# Patient Record
Sex: FEMALE | Race: WHITE | NOT HISPANIC OR LATINO | Employment: FULL TIME | URBAN - METROPOLITAN AREA
[De-identification: names, ages, dates, MRNs, and addresses within clinical notes are randomized per-mention and may not be internally consistent; named-entity substitution may affect disease eponyms.]

---

## 2018-10-03 ENCOUNTER — OFFICE VISIT (OUTPATIENT)
Dept: URGENT CARE | Facility: CLINIC | Age: 28
End: 2018-10-03
Payer: COMMERCIAL

## 2018-10-03 VITALS
SYSTOLIC BLOOD PRESSURE: 118 MMHG | HEART RATE: 88 BPM | BODY MASS INDEX: 31.08 KG/M2 | WEIGHT: 198 LBS | HEIGHT: 67 IN | OXYGEN SATURATION: 97 % | TEMPERATURE: 97.5 F | DIASTOLIC BLOOD PRESSURE: 80 MMHG | RESPIRATION RATE: 16 BRPM

## 2018-10-03 DIAGNOSIS — L08.9 BITE OF RIGHT LOWER LEG WITH INFECTION, INITIAL ENCOUNTER: ICD-10-CM

## 2018-10-03 DIAGNOSIS — S81.851A BITE OF RIGHT LOWER LEG WITH INFECTION, INITIAL ENCOUNTER: ICD-10-CM

## 2018-10-03 PROCEDURE — 99202 OFFICE O/P NEW SF 15 MIN: CPT | Performed by: PHYSICIAN ASSISTANT

## 2018-10-03 RX ORDER — MOXIFLOXACIN HYDROCHLORIDE 400 MG/1
400 TABLET ORAL DAILY
Qty: 3 TAB | Refills: 1 | Status: SHIPPED | OUTPATIENT
Start: 2018-10-03 | End: 2018-10-06

## 2018-10-03 RX ORDER — CLINDAMYCIN HYDROCHLORIDE 300 MG/1
300 CAPSULE ORAL 3 TIMES DAILY
Qty: 21 CAP | Refills: 0 | Status: SHIPPED | OUTPATIENT
Start: 2018-10-03 | End: 2018-10-10

## 2018-10-03 RX ORDER — DEXAMETHASONE SODIUM PHOSPHATE 4 MG/ML
8 INJECTION, SOLUTION INTRA-ARTICULAR; INTRALESIONAL; INTRAMUSCULAR; INTRAVENOUS; SOFT TISSUE ONCE
Status: COMPLETED | OUTPATIENT
Start: 2018-10-03 | End: 2018-10-03

## 2018-10-03 RX ORDER — CEFTRIAXONE SODIUM 250 MG/1
500 INJECTION, POWDER, FOR SOLUTION INTRAMUSCULAR; INTRAVENOUS ONCE
Status: COMPLETED | OUTPATIENT
Start: 2018-10-03 | End: 2018-10-03

## 2018-10-03 RX ORDER — DEXAMETHASONE 4 MG/1
8 TABLET ORAL DAILY
Qty: 4 TAB | Refills: 0 | Status: SHIPPED | OUTPATIENT
Start: 2018-10-03 | End: 2018-10-05

## 2018-10-03 RX ADMIN — CEFTRIAXONE SODIUM 500 MG: 250 INJECTION, POWDER, FOR SOLUTION INTRAMUSCULAR; INTRAVENOUS at 16:17

## 2018-10-03 RX ADMIN — DEXAMETHASONE SODIUM PHOSPHATE 8 MG: 4 INJECTION, SOLUTION INTRA-ARTICULAR; INTRALESIONAL; INTRAMUSCULAR; INTRAVENOUS; SOFT TISSUE at 16:20

## 2018-10-03 ASSESSMENT — ENCOUNTER SYMPTOMS
NEUROLOGICAL NEGATIVE: 1
FEVER: 0
MUSCULOSKELETAL NEGATIVE: 1
JOINT SWELLING: 0
CONSTITUTIONAL NEGATIVE: 1
SWOLLEN GLANDS: 0

## 2018-10-03 NOTE — PROGRESS NOTES
"Subjective:      Sobeida Valle is a 28 y.o. female who presents with Spider Bite (x3days, right lower leg spider bite, swollen, redness, pain, itchy)            Other   This is a new (r lower leg bite/infection) problem. The current episode started in the past 7 days. The problem occurs constantly. The problem has been unchanged. Pertinent negatives include no fever, joint swelling or swollen glands. Nothing aggravates the symptoms. She has tried nothing for the symptoms. The treatment provided no relief.       Review of Systems   Constitutional: Negative.  Negative for fever.   Musculoskeletal: Negative.  Negative for joint swelling.   Skin: Negative.    Neurological: Negative.           Objective:     /80 (BP Location: Left arm, Patient Position: Sitting, BP Cuff Size: Adult)   Pulse 88   Temp 36.4 °C (97.5 °F) (Temporal)   Resp 16   Ht 1.689 m (5' 6.5\")   Wt 89.8 kg (198 lb)   SpO2 97%   BMI 31.48 kg/m²      Physical Exam   Constitutional: She is oriented to person, place, and time. She appears well-developed and well-nourished. No distress.   Musculoskeletal: Normal range of motion. She exhibits tenderness. She exhibits no edema.   Neurological: She is alert and oriented to person, place, and time. No sensory deficit. She exhibits normal muscle tone. Coordination normal.   Skin: Skin is warm and dry. Capillary refill takes less than 2 seconds. There is erythema.   Psychiatric: She has a normal mood and affect. Her behavior is normal.   Nursing note and vitals reviewed.    Active Ambulatory Problems     Diagnosis Date Noted   • No Active Ambulatory Problems     Resolved Ambulatory Problems     Diagnosis Date Noted   • No Resolved Ambulatory Problems     No Additional Past Medical History     No current outpatient prescriptions on file prior to visit.     No current facility-administered medications on file prior to visit.      Social History     Social History   • Marital status:      Spouse " name: N/A   • Number of children: N/A   • Years of education: N/A     Occupational History   • Not on file.     Social History Main Topics   • Smoking status: Never Smoker   • Smokeless tobacco: Never Used   • Alcohol use Not on file   • Drug use: Unknown   • Sexual activity: Not on file     Other Topics Concern   • Not on file     Social History Narrative   • No narrative on file     History reviewed. No pertinent family history.  Patient has no known allergies.              Assessment/Plan:     ·  skin infection/inflamm      rx meds; local wound care  Go to ER if worsens

## 2018-12-31 ENCOUNTER — OFFICE VISIT (OUTPATIENT)
Dept: URGENT CARE | Facility: CLINIC | Age: 28
End: 2018-12-31
Payer: COMMERCIAL

## 2018-12-31 VITALS
BODY MASS INDEX: 32.95 KG/M2 | OXYGEN SATURATION: 98 % | HEART RATE: 83 BPM | WEIGHT: 205 LBS | TEMPERATURE: 97.9 F | DIASTOLIC BLOOD PRESSURE: 88 MMHG | SYSTOLIC BLOOD PRESSURE: 112 MMHG | RESPIRATION RATE: 16 BRPM | HEIGHT: 66 IN

## 2018-12-31 DIAGNOSIS — J02.9 SORE THROAT: ICD-10-CM

## 2018-12-31 DIAGNOSIS — J01.40 ACUTE PANSINUSITIS, RECURRENCE NOT SPECIFIED: ICD-10-CM

## 2018-12-31 DIAGNOSIS — J45.901 MILD ASTHMA WITH ACUTE EXACERBATION, UNSPECIFIED WHETHER PERSISTENT: ICD-10-CM

## 2018-12-31 LAB
INT CON NEG: NEGATIVE
INT CON POS: POSITIVE
S PYO AG THROAT QL: NEGATIVE

## 2018-12-31 PROCEDURE — 87880 STREP A ASSAY W/OPTIC: CPT | Performed by: PHYSICIAN ASSISTANT

## 2018-12-31 PROCEDURE — 99214 OFFICE O/P EST MOD 30 MIN: CPT | Performed by: PHYSICIAN ASSISTANT

## 2018-12-31 RX ORDER — AMOXICILLIN AND CLAVULANATE POTASSIUM 875; 125 MG/1; MG/1
1 TABLET, FILM COATED ORAL 2 TIMES DAILY
Qty: 14 TAB | Refills: 0 | Status: SHIPPED | OUTPATIENT
Start: 2018-12-31 | End: 2019-01-07

## 2018-12-31 RX ORDER — ALBUTEROL SULFATE 90 UG/1
2 AEROSOL, METERED RESPIRATORY (INHALATION) EVERY 6 HOURS PRN
Qty: 8.5 G | Refills: 0 | Status: SHIPPED | OUTPATIENT
Start: 2018-12-31

## 2018-12-31 RX ORDER — ACETAMINOPHEN 325 MG/1
650 TABLET ORAL EVERY 4 HOURS PRN
COMMUNITY

## 2018-12-31 ASSESSMENT — ENCOUNTER SYMPTOMS
EYE REDNESS: 0
SWOLLEN GLANDS: 1
HOARSE VOICE: 1
WHEEZING: 1
SINUS PRESSURE: 1
COUGH: 1
TINGLING: 0
FEVER: 0
MYALGIAS: 0
CHILLS: 0
HEADACHES: 1
DIZZINESS: 0
EYE DISCHARGE: 0
NECK PAIN: 0
SINUS PAIN: 1
SORE THROAT: 1
VOMITING: 0
DIARRHEA: 0

## 2018-12-31 ASSESSMENT — PATIENT HEALTH QUESTIONNAIRE - PHQ9: CLINICAL INTERPRETATION OF PHQ2 SCORE: 0

## 2018-12-31 NOTE — PROGRESS NOTES
"Subjective:      Sobeida Valle is a 28 y.o. female who presents with Pharyngitis (cough and sinus x3wks, taking tylenol not helping, post nasal drip, congestion on both ears, taking otc without help)            Sinusitis   This is a new problem. Episode onset: 3 weeks ago. The problem has been gradually worsening since onset. There has been no fever. Her pain is at a severity of 4/10. The pain is moderate. Associated symptoms include congestion, coughing, headaches, a hoarse voice, sinus pressure, a sore throat and swollen glands. Pertinent negatives include no chills, ear pain or neck pain. Treatments tried: gargles, cough drops, theraflu.    Patient also reports history of asthma of which she currently does not have a rescue inhaler at this time.  She does report slight wheezing intermittently over the last few weeks.  She currently denies any shortness of breath or chest tightness.    Review of Systems   Constitutional: Positive for malaise/fatigue. Negative for chills and fever.   HENT: Positive for congestion, hoarse voice, sinus pain, sinus pressure and sore throat. Negative for ear discharge and ear pain.         Pos. For ear pressure     Eyes: Negative for discharge and redness.   Respiratory: Positive for cough and wheezing.    Cardiovascular: Negative for chest pain.   Gastrointestinal: Negative for diarrhea and vomiting.   Genitourinary: Negative for dysuria and urgency.   Musculoskeletal: Negative for myalgias and neck pain.   Skin: Negative for itching and rash.   Neurological: Positive for headaches. Negative for dizziness and tingling.   All other systems reviewed and are negative.         Objective:     /88 (BP Location: Left arm, Patient Position: Sitting, BP Cuff Size: Adult)   Pulse 83   Temp 36.6 °C (97.9 °F) (Temporal)   Resp 16   Ht 1.676 m (5' 6\")   Wt 93 kg (205 lb)   LMP 12/05/2018   SpO2 98%   Breastfeeding? No   BMI 33.09 kg/m²    PMH:  has no past medical history on " file.  MEDS:   Current Outpatient Prescriptions:   •  acetaminophen (TYLENOL) 325 MG Tab, Take 650 mg by mouth every four hours as needed., Disp: , Rfl:   •  amoxicillin-clavulanate (AUGMENTIN) 875-125 MG Tab, Take 1 Tab by mouth 2 times a day for 7 days., Disp: 14 Tab, Rfl: 0  •  albuterol 108 (90 Base) MCG/ACT Aero Soln inhalation aerosol, Inhale 2 Puffs by mouth every 6 hours as needed for Shortness of Breath., Disp: 8.5 g, Rfl: 0  •  IBUPROFEN PO, Take  by mouth., Disp: , Rfl:   •  DiphenhydrAMINE HCl (BENADRYL PO), Take  by mouth., Disp: , Rfl:   ALLERGIES:   Allergies   Allergen Reactions   • Sudafed      bp goes up     SURGHX: No past surgical history on file.  SOCHX:  reports that she has never smoked. She has never used smokeless tobacco.  FH: Family history was reviewed, no pertinent findings to report    Physical Exam   Constitutional: She is oriented to person, place, and time. She appears well-developed and well-nourished.   HENT:   Head: Normocephalic and atraumatic.   Mouth/Throat: No oropharyngeal exudate.   Bilateral clear effusions- without bulge or erythema to the TM.   Posterior oropharynx with pos. PND without tonsillar edema or erythema.   Nose- boggy turbinates with mild-moderate amount of nasal discharge. Bilateral maxillary sinus tenderness with percussion.    Eyes: Pupils are equal, round, and reactive to light. EOM are normal.   Neck: Normal range of motion. Neck supple.   Cardiovascular: Normal rate and regular rhythm.    Pulmonary/Chest: Effort normal and breath sounds normal. No respiratory distress. She has no wheezes.   Musculoskeletal: Normal range of motion. She exhibits no edema.   Lymphadenopathy:     She has no cervical adenopathy.   Neurological: She is alert and oriented to person, place, and time.   Skin: Skin is warm. No rash noted.   Psychiatric: She has a normal mood and affect. Her behavior is normal.   Vitals reviewed.            POC strep- negative.     Assessment/Plan:      1. Acute pansinusitis, recurrence not specified  - amoxicillin-clavulanate (AUGMENTIN) 875-125 MG Tab; Take 1 Tab by mouth 2 times a day for 7 days.  Dispense: 14 Tab; Refill: 0  - albuterol 108 (90 Base) MCG/ACT Aero Soln inhalation aerosol; Inhale 2 Puffs by mouth every 6 hours as needed for Shortness of Breath.  Dispense: 8.5 g; Refill: 0  - POCT Rapid Strep A    2. Sore throat  - POCT Rapid Strep A    3. Mild asthma with acute exacerbation, unspecified whether persistent    Due to duration of symptoms, sinus tenderness, and failure of OTC therapies- ABX was written to tx. For bacterial etiology for sinusitis.   Continue OTC supportive therapies- Flonase, OTC allergy meds, avoid night time dairy. Increase fluids. Humidification.   Patient given precautionary s/sx that mandate immediate follow up and evaluation in the ED. Advised of risks of not doing so.    DDX, Supportive care, and indications for immediate follow-up discussed with patient.    Instructed to return to clinic or nearest emergency department if we are not available for any change in condition, further concerns, or worsening of symptoms.    The patient demonstrated a good understanding and agreed with the treatment plan

## 2019-12-01 ENCOUNTER — APPOINTMENT (OUTPATIENT)
Dept: RADIOLOGY | Facility: IMAGING CENTER | Age: 29
End: 2019-12-01
Attending: EMERGENCY MEDICINE
Payer: COMMERCIAL

## 2019-12-01 ENCOUNTER — OFFICE VISIT (OUTPATIENT)
Dept: URGENT CARE | Facility: CLINIC | Age: 29
End: 2019-12-01
Payer: COMMERCIAL

## 2019-12-01 VITALS
OXYGEN SATURATION: 96 % | RESPIRATION RATE: 18 BRPM | DIASTOLIC BLOOD PRESSURE: 80 MMHG | WEIGHT: 205 LBS | TEMPERATURE: 98.3 F | BODY MASS INDEX: 32.95 KG/M2 | SYSTOLIC BLOOD PRESSURE: 116 MMHG | HEIGHT: 66 IN | HEART RATE: 95 BPM

## 2019-12-01 DIAGNOSIS — M23.91 KNEE DERANGEMENT, RIGHT: ICD-10-CM

## 2019-12-01 DIAGNOSIS — S89.91XA KNEE INJURY, RIGHT, INITIAL ENCOUNTER: ICD-10-CM

## 2019-12-01 PROCEDURE — 73564 X-RAY EXAM KNEE 4 OR MORE: CPT | Mod: TC,RT | Performed by: EMERGENCY MEDICINE

## 2019-12-01 PROCEDURE — 99203 OFFICE O/P NEW LOW 30 MIN: CPT | Performed by: EMERGENCY MEDICINE

## 2019-12-01 ASSESSMENT — PAIN SCALES - GENERAL: PAINLEVEL: 4=SLIGHT-MODERATE PAIN

## 2019-12-01 ASSESSMENT — ENCOUNTER SYMPTOMS
SENSORY CHANGE: 0
FEVER: 0
NUMBNESS: 0
JOINT SWELLING: 0
WEAKNESS: 0

## 2019-12-01 NOTE — PROGRESS NOTES
"Subjective:      Sobeida Valle is a 29 y.o. female who presents with Knee Injury (right knee, fell off trampoline )            Knee Injury   This is a new problem. The current episode started today. Pertinent negatives include no fever, joint swelling, numbness, rash or weakness. The symptoms are aggravated by bending and walking. She has tried rest and ice for the symptoms. The treatment provided mild relief.   Notes twisted knee when falling off of a trampoline today; no other trauma.  Notes remote history of hyperextension injury of right knee.  Notes popping sensation, sense of instability associated with injury.    Review of Systems   Constitutional: Negative for fever.   Musculoskeletal: Negative for joint swelling.   Skin: Negative for rash.   Neurological: Negative for sensory change, weakness and numbness.       PMH:  has no past medical history on file.  MEDS:   Current Outpatient Medications:   •  acetaminophen (TYLENOL) 325 MG Tab, Take 650 mg by mouth every four hours as needed., Disp: , Rfl:   •  albuterol 108 (90 Base) MCG/ACT Aero Soln inhalation aerosol, Inhale 2 Puffs by mouth every 6 hours as needed for Shortness of Breath., Disp: 8.5 g, Rfl: 0  •  IBUPROFEN PO, Take  by mouth., Disp: , Rfl:   •  DiphenhydrAMINE HCl (BENADRYL PO), Take  by mouth., Disp: , Rfl:   ALLERGIES:   Allergies   Allergen Reactions   • Sudafed      bp goes up     SURGHX: No past surgical history on file.  SOCHX:  reports that she has never smoked. She has never used smokeless tobacco.  FH: family history is not on file.   Objective:     /80   Pulse 95   Temp 36.8 °C (98.3 °F) (Temporal)   Resp 18   Ht 1.676 m (5' 6\")   Wt 93 kg (205 lb)   SpO2 96%   BMI 33.09 kg/m²      Physical Exam  Constitutional:       General: She is not in acute distress.     Appearance: Normal appearance. She is well-developed. She is not ill-appearing.   Cardiovascular:      Pulses:           Dorsalis pedis pulses are 2+ on the right " side.   Musculoskeletal:      Right knee: She exhibits decreased range of motion, effusion, ecchymosis and LCL laxity. She exhibits no deformity, no erythema, normal alignment, normal patellar mobility and no MCL laxity. Tenderness found. Lateral joint line and LCL tenderness noted. No patellar tendon tenderness noted.      Comments: Soft endpoint on drawer testing; no gross instability.   Skin:     General: Skin is warm and dry.      Findings: Ecchymosis present.   Neurological:      Mental Status: She is alert.      Comments: Distal motor function intact. Distal sensation to light touch and pressure intact.   Psychiatric:         Behavior: Behavior is cooperative.            Advised patient of suspicion for cruciate ligament injury.     Assessment/Plan:       1. Knee derangement, right  Hinged knee brace, crutches and nonweightbearing  Elevation, ice, OTC analgesia PRN.  Referral to sports medicine    2. Knee injury, right, initial encounter  - DX-KNEE COMPLETE 4+ RIGHT; per radiologist:  Bone density is normal. There is no evidence of fracture or dislocation. There is no evidence of arthropathy. There is a small joint effusion.

## 2019-12-04 ENCOUNTER — OFFICE VISIT (OUTPATIENT)
Dept: MEDICAL GROUP | Facility: CLINIC | Age: 29
End: 2019-12-04
Payer: COMMERCIAL

## 2019-12-04 VITALS
DIASTOLIC BLOOD PRESSURE: 80 MMHG | SYSTOLIC BLOOD PRESSURE: 120 MMHG | HEART RATE: 86 BPM | HEIGHT: 66 IN | TEMPERATURE: 98 F | OXYGEN SATURATION: 98 % | WEIGHT: 205 LBS | RESPIRATION RATE: 14 BRPM | BODY MASS INDEX: 32.95 KG/M2

## 2019-12-04 DIAGNOSIS — M23.91 ACUTE INTERNAL DERANGEMENT OF RIGHT KNEE: ICD-10-CM

## 2019-12-04 PROCEDURE — 99203 OFFICE O/P NEW LOW 30 MIN: CPT | Performed by: FAMILY MEDICINE

## 2019-12-05 ASSESSMENT — ENCOUNTER SYMPTOMS
SHORTNESS OF BREATH: 0
FEVER: 0
FOCAL WEAKNESS: 0
VOMITING: 0

## 2019-12-05 NOTE — PROGRESS NOTES
"Subjective:     Sobeida Valle is a 29 y.o. female who presents for Knee Injury (Onset 12/1/19, fell off trampoline and injurying right knee. The patient has a H/O of injurying the right knee in the past.)    HPI  Pt presents for evaluation of right knee pain   Pt jumping on trampoline   Landed off the trampoline and landed on footed on right leg   Had immediate pain and felt a pop   Knee feels very unstable   Unable to bear weight without significant pain  Has been on crutches  Was seen in urgent care and had x-ray which did not show acute fracture or dislocation  Pain is mainly in the anterior knee  No radiating pain into foot/ankle  No associated numbness/tingling    Review of Systems   Constitutional: Negative for fever.   Respiratory: Negative for shortness of breath.    Cardiovascular: Negative for chest pain.   Gastrointestinal: Negative for vomiting.   Skin: Negative for rash.   Neurological: Negative for focal weakness.       PMH: No chronic medical problems   MEDS:   Current Outpatient Medications:   •  acetaminophen (TYLENOL) 325 MG Tab, Take 650 mg by mouth every four hours as needed., Disp: , Rfl:   •  IBUPROFEN PO, Take 1 Tab by mouth as needed., Disp: , Rfl:   •  albuterol 108 (90 Base) MCG/ACT Aero Soln inhalation aerosol, Inhale 2 Puffs by mouth every 6 hours as needed for Shortness of Breath. (Patient not taking: Reported on 12/4/2019), Disp: 8.5 g, Rfl: 0  •  DiphenhydrAMINE HCl (BENADRYL PO), Take  by mouth., Disp: , Rfl:   ALLERGIES:   Allergies   Allergen Reactions   • Sudafed      bp goes up     SURGHX: No past surgical history on file.  SOCHX:  reports that she has never smoked. She has never used smokeless tobacco.  FH: Family history was reviewed, not contributing to acute injury      Objective:   /80 (BP Location: Left arm, Patient Position: Sitting, BP Cuff Size: Adult)   Pulse 86   Temp 36.7 °C (98 °F) (Temporal)   Resp 14   Ht 1.676 m (5' 6\")   Wt 93 kg (205 lb)   SpO2 98%   " BMI 33.09 kg/m²     Physical Exam  Constitutional:       General: She is not in acute distress.     Appearance: She is well-developed. She is not diaphoretic.   HENT:      Head: Normocephalic and atraumatic.   Musculoskeletal:      Comments: Right knee  Appearance -moderate swelling and effusion appreciated  Palpation - +TTP along lateral joint line, patellar tendon, medial quad, and pes anserine  ROM - FROM with moderate pain on flexion past 90  Strength - 5/5 throughout  Neuro - Sensation equal and intact bilaterally  Special testing - No laxity or pain with varus/valgus stress, +anterior drawer, neg posterior drawer, +Lachman's, neg Humberto's, neg patellar apprehension test   Skin:     General: Skin is warm and dry.      Findings: No erythema.   Neurological:      Mental Status: She is alert and oriented to person, place, and time.      Sensory: No sensory deficit.   Psychiatric:         Behavior: Behavior normal.         Thought Content: Thought content normal.         Judgment: Judgment normal.         Assessment/Plan:   Assessment    1. Acute internal derangement of right knee  - MR-KNEE-W/O RIGHT; Future    Pt with laxity on exam and high concern for ACL tear.  Will obtain MRI to further evaluate and call with results.  Patient advised to work on light range of motion exercises.  May start ambulating with crutches to assist if feeling improved.

## 2019-12-12 ENCOUNTER — HOSPITAL ENCOUNTER (OUTPATIENT)
Dept: RADIOLOGY | Facility: MEDICAL CENTER | Age: 29
End: 2019-12-12
Attending: FAMILY MEDICINE
Payer: COMMERCIAL

## 2019-12-12 DIAGNOSIS — M23.91 ACUTE INTERNAL DERANGEMENT OF RIGHT KNEE: ICD-10-CM

## 2019-12-12 PROCEDURE — 73721 MRI JNT OF LWR EXTRE W/O DYE: CPT | Mod: RT

## 2019-12-13 ENCOUNTER — TELEPHONE (OUTPATIENT)
Dept: URGENT CARE | Facility: CLINIC | Age: 29
End: 2019-12-13

## 2019-12-13 DIAGNOSIS — S83.511A RUPTURE OF ANTERIOR CRUCIATE LIGAMENT OF RIGHT KNEE, INITIAL ENCOUNTER: ICD-10-CM

## 2019-12-13 NOTE — TELEPHONE ENCOUNTER
Called pt with MRI results.  Shows the followin.  Full-thickness tear of the midsubstance of the anterior cruciate ligament.     2.  Mild sprain of the posterior cruciate ligament.     3.  Mild sprain of the medial collateral and fibular collateral ligaments.     4.  Marrow edema with mild subchondral impaction fracture of the proximal tibia posterior laterally and to a lesser degree posterior medially. There is also mild marrow edema involving the lateral femoral condyle.     5.  Large joint effusion.     6.  Mild fissuring of the lateral patellar facet cartilage.    Patient referred to orthopedics.  She specifically requests to see Dr. Palencia as he is done surgery on a friend of hers.  Patient advised to be nonweightbearing for now until she can get into orthopedics.

## 2019-12-17 RX ORDER — IBUPROFEN 200 MG
200 TABLET ORAL EVERY 6 HOURS PRN
COMMUNITY
End: 2022-06-24

## 2019-12-17 NOTE — OR NURSING
"Preadmit appointment: \" Preparing for your Procedure information\" sheet given to patient with verbal and written instructions. Patient instructed to continue prescribed medications through the day before surgery, instructed to take the following medications the day of surgery per anesthesia protocol: Albuterol inhaler if needed and pt instructed to bring day of surgery.  Pt denies any issues with anesthesia for herself or her family,  Pt has only had wisdom teeth.   "

## 2019-12-19 ENCOUNTER — HOSPITAL ENCOUNTER (OUTPATIENT)
Facility: MEDICAL CENTER | Age: 29
End: 2019-12-19
Attending: ORTHOPAEDIC SURGERY | Admitting: ORTHOPAEDIC SURGERY
Payer: COMMERCIAL

## 2019-12-19 ENCOUNTER — ANESTHESIA EVENT (OUTPATIENT)
Dept: SURGERY | Facility: MEDICAL CENTER | Age: 29
End: 2019-12-19
Payer: COMMERCIAL

## 2019-12-19 ENCOUNTER — ANESTHESIA (OUTPATIENT)
Dept: SURGERY | Facility: MEDICAL CENTER | Age: 29
End: 2019-12-19
Payer: COMMERCIAL

## 2019-12-19 VITALS
RESPIRATION RATE: 16 BRPM | DIASTOLIC BLOOD PRESSURE: 65 MMHG | OXYGEN SATURATION: 96 % | TEMPERATURE: 97.2 F | SYSTOLIC BLOOD PRESSURE: 133 MMHG | HEIGHT: 66 IN | BODY MASS INDEX: 32.99 KG/M2 | WEIGHT: 205.25 LBS | HEART RATE: 94 BPM

## 2019-12-19 DIAGNOSIS — Z98.890 S/P ARTHROSCOPY OF RIGHT KNEE: ICD-10-CM

## 2019-12-19 LAB — HCG UR QL: NEGATIVE

## 2019-12-19 PROCEDURE — 700101 HCHG RX REV CODE 250: Performed by: ORTHOPAEDIC SURGERY

## 2019-12-19 PROCEDURE — 700111 HCHG RX REV CODE 636 W/ 250 OVERRIDE (IP): Performed by: ORTHOPAEDIC SURGERY

## 2019-12-19 PROCEDURE — C1762 CONN TISS, HUMAN(INC FASCIA): HCPCS | Performed by: ORTHOPAEDIC SURGERY

## 2019-12-19 PROCEDURE — 160025 RECOVERY II MINUTES (STATS): Performed by: ORTHOPAEDIC SURGERY

## 2019-12-19 PROCEDURE — 501838 HCHG SUTURE GENERAL: Performed by: ORTHOPAEDIC SURGERY

## 2019-12-19 PROCEDURE — 700105 HCHG RX REV CODE 258: Performed by: ORTHOPAEDIC SURGERY

## 2019-12-19 PROCEDURE — 502580 HCHG PACK, KNEE ARTHROSCOPY: Performed by: ORTHOPAEDIC SURGERY

## 2019-12-19 PROCEDURE — A9270 NON-COVERED ITEM OR SERVICE: HCPCS | Performed by: ANESTHESIOLOGY

## 2019-12-19 PROCEDURE — 160046 HCHG PACU - 1ST 60 MINS PHASE II: Performed by: ORTHOPAEDIC SURGERY

## 2019-12-19 PROCEDURE — 700101 HCHG RX REV CODE 250: Performed by: ANESTHESIOLOGY

## 2019-12-19 PROCEDURE — 700102 HCHG RX REV CODE 250 W/ 637 OVERRIDE(OP): Performed by: ANESTHESIOLOGY

## 2019-12-19 PROCEDURE — 160036 HCHG PACU - EA ADDL 30 MINS PHASE I: Performed by: ORTHOPAEDIC SURGERY

## 2019-12-19 PROCEDURE — 160041 HCHG SURGERY MINUTES - EA ADDL 1 MIN LEVEL 4: Performed by: ORTHOPAEDIC SURGERY

## 2019-12-19 PROCEDURE — 500562 HCHG FIBERWIRE: Performed by: ORTHOPAEDIC SURGERY

## 2019-12-19 PROCEDURE — 700111 HCHG RX REV CODE 636 W/ 250 OVERRIDE (IP): Performed by: ANESTHESIOLOGY

## 2019-12-19 PROCEDURE — 501512 HCHG SUTURE PASSER, KNOT PUSHER: Performed by: ORTHOPAEDIC SURGERY

## 2019-12-19 PROCEDURE — 160022 HCHG BLOCK: Performed by: ORTHOPAEDIC SURGERY

## 2019-12-19 PROCEDURE — 81025 URINE PREGNANCY TEST: CPT

## 2019-12-19 PROCEDURE — 160002 HCHG RECOVERY MINUTES (STAT): Performed by: ORTHOPAEDIC SURGERY

## 2019-12-19 PROCEDURE — 160009 HCHG ANES TIME/MIN: Performed by: ORTHOPAEDIC SURGERY

## 2019-12-19 PROCEDURE — A9270 NON-COVERED ITEM OR SERVICE: HCPCS | Performed by: ORTHOPAEDIC SURGERY

## 2019-12-19 PROCEDURE — 502240 HCHG MISC OR SUPPLY RC 0272: Performed by: ORTHOPAEDIC SURGERY

## 2019-12-19 PROCEDURE — 700102 HCHG RX REV CODE 250 W/ 637 OVERRIDE(OP): Performed by: ORTHOPAEDIC SURGERY

## 2019-12-19 PROCEDURE — 160029 HCHG SURGERY MINUTES - 1ST 30 MINS LEVEL 4: Performed by: ORTHOPAEDIC SURGERY

## 2019-12-19 PROCEDURE — 160035 HCHG PACU - 1ST 60 MINS PHASE I: Performed by: ORTHOPAEDIC SURGERY

## 2019-12-19 PROCEDURE — C1713 ANCHOR/SCREW BN/BN,TIS/BN: HCPCS | Performed by: ORTHOPAEDIC SURGERY

## 2019-12-19 PROCEDURE — 160048 HCHG OR STATISTICAL LEVEL 1-5: Performed by: ORTHOPAEDIC SURGERY

## 2019-12-19 PROCEDURE — 500028 HCHG ARTHROWAND TURBOVAC 3.5/90 SUCT.: Performed by: ORTHOPAEDIC SURGERY

## 2019-12-19 DEVICE — ANCHOR SUTURE FASTFIX 360 CURVED: Type: IMPLANTABLE DEVICE | Site: KNEE | Status: FUNCTIONAL

## 2019-12-19 DEVICE — SCREW BIOSURE 10 X 25MM: Type: IMPLANTABLE DEVICE | Site: KNEE | Status: FUNCTIONAL

## 2019-12-19 DEVICE — SUTURE ANCHOR FOOTPRINT FIX 4.5MM: Type: IMPLANTABLE DEVICE | Site: KNEE | Status: FUNCTIONAL

## 2019-12-19 DEVICE — GRAFT SOFT TISSUE ANTERIOR TIBIALIS TENDON 23CM: Type: IMPLANTABLE DEVICE | Site: KNEE | Status: FUNCTIONAL

## 2019-12-19 DEVICE — ENDOBUTTON CL ULTRA 15MM: Type: IMPLANTABLE DEVICE | Site: KNEE | Status: FUNCTIONAL

## 2019-12-19 RX ORDER — BACITRACIN ZINC 500 [USP'U]/G
OINTMENT TOPICAL
Status: DISCONTINUED | OUTPATIENT
Start: 2019-12-19 | End: 2019-12-19 | Stop reason: HOSPADM

## 2019-12-19 RX ORDER — DIPHENHYDRAMINE HYDROCHLORIDE 50 MG/ML
6.25 INJECTION INTRAMUSCULAR; INTRAVENOUS
Status: DISCONTINUED | OUTPATIENT
Start: 2019-12-19 | End: 2019-12-19 | Stop reason: HOSPADM

## 2019-12-19 RX ORDER — HYDROMORPHONE HYDROCHLORIDE 1 MG/ML
0.1 INJECTION, SOLUTION INTRAMUSCULAR; INTRAVENOUS; SUBCUTANEOUS
Status: DISCONTINUED | OUTPATIENT
Start: 2019-12-19 | End: 2019-12-19 | Stop reason: HOSPADM

## 2019-12-19 RX ORDER — HYDROMORPHONE HYDROCHLORIDE 1 MG/ML
0.4 INJECTION, SOLUTION INTRAMUSCULAR; INTRAVENOUS; SUBCUTANEOUS
Status: DISCONTINUED | OUTPATIENT
Start: 2019-12-19 | End: 2019-12-19 | Stop reason: HOSPADM

## 2019-12-19 RX ORDER — LIDOCAINE HYDROCHLORIDE 10 MG/ML
INJECTION, SOLUTION INFILTRATION; PERINEURAL
Status: DISCONTINUED
Start: 2019-12-19 | End: 2019-12-19 | Stop reason: HOSPADM

## 2019-12-19 RX ORDER — LIDOCAINE HYDROCHLORIDE 20 MG/ML
INJECTION, SOLUTION EPIDURAL; INFILTRATION; INTRACAUDAL; PERINEURAL PRN
Status: DISCONTINUED | OUTPATIENT
Start: 2019-12-19 | End: 2019-12-19 | Stop reason: SURG

## 2019-12-19 RX ORDER — ONDANSETRON 2 MG/ML
4 INJECTION INTRAMUSCULAR; INTRAVENOUS
Status: COMPLETED | OUTPATIENT
Start: 2019-12-19 | End: 2019-12-19

## 2019-12-19 RX ORDER — HYDRALAZINE HYDROCHLORIDE 20 MG/ML
5 INJECTION INTRAMUSCULAR; INTRAVENOUS
Status: DISCONTINUED | OUTPATIENT
Start: 2019-12-19 | End: 2019-12-19 | Stop reason: HOSPADM

## 2019-12-19 RX ORDER — OXYCODONE HCL 5 MG/5 ML
10 SOLUTION, ORAL ORAL
Status: COMPLETED | OUTPATIENT
Start: 2019-12-19 | End: 2019-12-19

## 2019-12-19 RX ORDER — SODIUM CHLORIDE, SODIUM LACTATE, POTASSIUM CHLORIDE, CALCIUM CHLORIDE 600; 310; 30; 20 MG/100ML; MG/100ML; MG/100ML; MG/100ML
INJECTION, SOLUTION INTRAVENOUS CONTINUOUS
Status: DISCONTINUED | OUTPATIENT
Start: 2019-12-19 | End: 2019-12-19 | Stop reason: HOSPADM

## 2019-12-19 RX ORDER — ROPIVACAINE HYDROCHLORIDE 5 MG/ML
INJECTION, SOLUTION EPIDURAL; INFILTRATION; PERINEURAL PRN
Status: DISCONTINUED | OUTPATIENT
Start: 2019-12-19 | End: 2019-12-19 | Stop reason: SURG

## 2019-12-19 RX ORDER — MIDAZOLAM HYDROCHLORIDE 1 MG/ML
INJECTION INTRAMUSCULAR; INTRAVENOUS PRN
Status: DISCONTINUED | OUTPATIENT
Start: 2019-12-19 | End: 2019-12-19 | Stop reason: SURG

## 2019-12-19 RX ORDER — GABAPENTIN 300 MG/1
300 CAPSULE ORAL
Status: COMPLETED | OUTPATIENT
Start: 2019-12-19 | End: 2019-12-19

## 2019-12-19 RX ORDER — CELECOXIB 200 MG/1
400 CAPSULE ORAL ONCE
Status: COMPLETED | OUTPATIENT
Start: 2019-12-19 | End: 2019-12-19

## 2019-12-19 RX ORDER — HYDROMORPHONE HYDROCHLORIDE 1 MG/ML
0.2 INJECTION, SOLUTION INTRAMUSCULAR; INTRAVENOUS; SUBCUTANEOUS
Status: DISCONTINUED | OUTPATIENT
Start: 2019-12-19 | End: 2019-12-19 | Stop reason: HOSPADM

## 2019-12-19 RX ORDER — OXYCODONE HYDROCHLORIDE AND ACETAMINOPHEN 5; 325 MG/1; MG/1
1-2 TABLET ORAL EVERY 4 HOURS PRN
Qty: 40 TAB | Refills: 0 | Status: SHIPPED | OUTPATIENT
Start: 2019-12-19 | End: 2019-12-26

## 2019-12-19 RX ORDER — DEXAMETHASONE SODIUM PHOSPHATE 4 MG/ML
INJECTION, SOLUTION INTRA-ARTICULAR; INTRALESIONAL; INTRAMUSCULAR; INTRAVENOUS; SOFT TISSUE PRN
Status: DISCONTINUED | OUTPATIENT
Start: 2019-12-19 | End: 2019-12-19 | Stop reason: SURG

## 2019-12-19 RX ORDER — MEPERIDINE HYDROCHLORIDE 25 MG/ML
12.5 INJECTION INTRAMUSCULAR; INTRAVENOUS; SUBCUTANEOUS
Status: DISCONTINUED | OUTPATIENT
Start: 2019-12-19 | End: 2019-12-19 | Stop reason: HOSPADM

## 2019-12-19 RX ORDER — CEFAZOLIN SODIUM 1 G/3ML
INJECTION, POWDER, FOR SOLUTION INTRAMUSCULAR; INTRAVENOUS PRN
Status: DISCONTINUED | OUTPATIENT
Start: 2019-12-19 | End: 2019-12-19 | Stop reason: SURG

## 2019-12-19 RX ORDER — HALOPERIDOL 5 MG/ML
1 INJECTION INTRAMUSCULAR
Status: DISCONTINUED | OUTPATIENT
Start: 2019-12-19 | End: 2019-12-19 | Stop reason: HOSPADM

## 2019-12-19 RX ORDER — EPINEPHRINE 1 MG/ML(1)
AMPUL (ML) INJECTION
Status: DISCONTINUED | OUTPATIENT
Start: 2019-12-19 | End: 2019-12-19 | Stop reason: HOSPADM

## 2019-12-19 RX ORDER — ROPIVACAINE HYDROCHLORIDE 5 MG/ML
INJECTION, SOLUTION EPIDURAL; INFILTRATION; PERINEURAL
Status: DISCONTINUED | OUTPATIENT
Start: 2019-12-19 | End: 2019-12-19 | Stop reason: HOSPADM

## 2019-12-19 RX ORDER — OXYCODONE HCL 5 MG/5 ML
5 SOLUTION, ORAL ORAL
Status: COMPLETED | OUTPATIENT
Start: 2019-12-19 | End: 2019-12-19

## 2019-12-19 RX ORDER — ACETAMINOPHEN 500 MG
1000 TABLET ORAL ONCE
Status: COMPLETED | OUTPATIENT
Start: 2019-12-19 | End: 2019-12-19

## 2019-12-19 RX ADMIN — FENTANYL CITRATE 50 MCG: 50 INJECTION, SOLUTION INTRAMUSCULAR; INTRAVENOUS at 13:51

## 2019-12-19 RX ADMIN — MIDAZOLAM HYDROCHLORIDE 2 MG: 1 INJECTION, SOLUTION INTRAMUSCULAR; INTRAVENOUS at 13:32

## 2019-12-19 RX ADMIN — PROPOFOL 200 MG: 10 INJECTION, EMULSION INTRAVENOUS at 13:51

## 2019-12-19 RX ADMIN — CELECOXIB 400 MG: 200 CAPSULE ORAL at 13:18

## 2019-12-19 RX ADMIN — GABAPENTIN 300 MG: 300 CAPSULE ORAL at 13:19

## 2019-12-19 RX ADMIN — FENTANYL CITRATE 50 MCG: 50 INJECTION INTRAMUSCULAR; INTRAVENOUS at 15:50

## 2019-12-19 RX ADMIN — FENTANYL CITRATE 50 MCG: 50 INJECTION, SOLUTION INTRAMUSCULAR; INTRAVENOUS at 14:54

## 2019-12-19 RX ADMIN — ROPIVACAINE HYDROCHLORIDE 20 ML: 5 INJECTION, SOLUTION EPIDURAL; INFILTRATION; PERINEURAL at 13:33

## 2019-12-19 RX ADMIN — HYDROMORPHONE HYDROCHLORIDE 0.2 MG: 1 INJECTION, SOLUTION INTRAMUSCULAR; INTRAVENOUS; SUBCUTANEOUS at 16:40

## 2019-12-19 RX ADMIN — DEXAMETHASONE SODIUM PHOSPHATE 8 MG: 4 INJECTION, SOLUTION INTRAMUSCULAR; INTRAVENOUS at 13:54

## 2019-12-19 RX ADMIN — CEFAZOLIN 2 G: 1 INJECTION, POWDER, FOR SOLUTION INTRAVENOUS at 13:51

## 2019-12-19 RX ADMIN — ACETAMINOPHEN 1000 MG: 500 TABLET, FILM COATED ORAL at 13:18

## 2019-12-19 RX ADMIN — LIDOCAINE HYDROCHLORIDE 0.5 ML: 10 INJECTION, SOLUTION INFILTRATION; PERINEURAL at 13:28

## 2019-12-19 RX ADMIN — FENTANYL CITRATE 50 MCG: 50 INJECTION, SOLUTION INTRAMUSCULAR; INTRAVENOUS at 14:05

## 2019-12-19 RX ADMIN — LIDOCAINE HYDROCHLORIDE 60 MG: 20 INJECTION, SOLUTION EPIDURAL; INFILTRATION; INTRACAUDAL; PERINEURAL at 13:51

## 2019-12-19 RX ADMIN — OXYCODONE HYDROCHLORIDE 10 MG: 5 SOLUTION ORAL at 15:30

## 2019-12-19 RX ADMIN — ONDANSETRON HYDROCHLORIDE 4 MG: 2 SOLUTION INTRAMUSCULAR; INTRAVENOUS at 15:39

## 2019-12-19 RX ADMIN — FENTANYL CITRATE 50 MCG: 50 INJECTION INTRAMUSCULAR; INTRAVENOUS at 15:33

## 2019-12-19 RX ADMIN — FENTANYL CITRATE 100 MCG: 50 INJECTION, SOLUTION INTRAMUSCULAR; INTRAVENOUS at 14:10

## 2019-12-19 RX ADMIN — SODIUM CHLORIDE, POTASSIUM CHLORIDE, SODIUM LACTATE AND CALCIUM CHLORIDE: 600; 310; 30; 20 INJECTION, SOLUTION INTRAVENOUS at 13:30

## 2019-12-19 RX ADMIN — HYDROMORPHONE HYDROCHLORIDE 0.2 MG: 1 INJECTION, SOLUTION INTRAMUSCULAR; INTRAVENOUS; SUBCUTANEOUS at 16:15

## 2019-12-19 ASSESSMENT — PAIN SCALES - GENERAL: PAIN_LEVEL: 4

## 2019-12-19 NOTE — OR NURSING
1511: Pt arrived to PACU s/p right knee reconstruction and meniscus repair with Dr. Beach. Supine position in stretcher. Oral airway in place. No signs of resp distress. NSR without ectopy. Unarousable to voice or light touch stimulation. Ace wrap to right knee clean, dry, intact. Ice pack to right knee. No surgical drains. Dr. Beach at bedside fitting pt to brace. Bilateral Dylon stockings and left SCD in place. Right does not fit with brace. +2 bilateral pedal pulses. <2 second cap refill on right toes. No signs of pain or nausea.      1524: Pt arousable to voice. Gag reflex intact. Oral airway removed. Transferred NC from oral cavity to nares.     1531: Pt moaning in pain. Able to hold conversation without falling asleep.       1538: Pt states she is nauseous. Please see MAR. No emesis. Pt slightly pale. RLE elevated with pillow under right foot.   1546: Pt states she is no longer nauseous. Warm blankets applied and pt encouraged to rest/nap.    1601: Pt states she is nauseous again. Episodes of heaving without emesis.   1603: Pt declining additional anti-nausea medication and states nausea is fading. Pt states pain is coming in waves. Anywhere from 3/10-8/10.     1608: pt sleeping, resting comfortably at this time    1640: Pt states pain is still coming in waves but is tolerable and she is ready to go to phase II.  [facility] called and aware of status.  1650: Report given to ALAN Garrido.

## 2019-12-19 NOTE — ANESTHESIA PROCEDURE NOTES
Airway  Date/Time: 12/19/2019 1:52 PM  Performed by: Milan Ludwig M.D.  Authorized by: Milan Ludwig M.D.     Location:  OR  Urgency:  Elective  Difficult Airway: No    Indications for Airway Management:  Anesthesia  Spontaneous Ventilation: absent    Sedation Level:  Deep  Preoxygenated: Yes    Mask Difficulty Assessment:  1 - vent by mask  Final Airway Type:  Supraglottic airway  Final Supraglottic Airway:  Standard LMA  SGA Size:  3  Number of Attempts at Approach:  1

## 2019-12-19 NOTE — ANESTHESIA PREPROCEDURE EVALUATION
Past Medical History:   Diagnosis Date   • Asthma     exercise induced   • Pain     Right knee         Relevant Problems   No relevant active problems       Physical Exam    Airway   Mallampati: II  TM distance: >3 FB  Neck ROM: full       Cardiovascular - normal exam  Rhythm: regular  Rate: normal  (-) murmur     Dental - normal exam         Pulmonary - normal exam  Breath sounds clear to auscultation     Abdominal    Neurological - normal exam                 Anesthesia Plan    ASA 2       Plan - general and peripheral nerve block     Peripheral nerve block will be post-op pain control  Airway plan will be LMA        Induction: intravenous    Postoperative Plan: Postoperative administration of opioids is intended.    Pertinent diagnostic labs and testing reviewed    Informed Consent:    Anesthetic plan and risks discussed with patient.

## 2019-12-19 NOTE — ANESTHESIA PROCEDURE NOTES
Peripheral Block  Date/Time: 12/19/2019 1:32 PM  Performed by: Milan Ludwig M.D.  Authorized by: Milan Ludwig M.D.     Patient Location:  Pre-op  Start Time:  12/19/2019 1:32 PM  End Time:  12/19/2019 1:34 PM  Reason for Block: at surgeon's request and post-op pain management    patient identified, IV checked, site marked, risks and benefits discussed, surgical consent, monitors and equipment checked, pre-op evaluation and timeout performed    Patient Position:  Supine  Prep: ChloraPrep    Monitoring:  Heart rate, continuous pulse ox and cardiac monitor  Block Region:  Lower Extremity  Lower Extremity - Block Type:  Selective FEMORAL nerve block at the Adductor Canal    Laterality:  Right  Procedures: ultrasound guided  Image captured, interpreted and electronically stored.  Local Infiltration:  Lidocaine  Strength:  1 %  Dose:  3 ml  Block Type:  Single-shot  Needle Length:  100mm  Needle Gauge:  21 G  Needle Localization:  Ultrasound guidance  Injection Assessment:  Negative aspiration for heme, no paresthesia on injection, incremental injection and local visualized surrounding nerve on ultrasound  Evidence of intravascular injection: No     US Guided Selective Femoral Nerve Block at Adductor Canal:   US probe placed at mid-thigh level on externally rotated leg and femur identified.  Probe directed medially until Sartorius Muscle (SM), Femoral Artery (FA) and Saphenous Nerve (SN) identified in Adductor Canal (AC).  Needle inserted anterolateral to probe in an in plane approach into a subsartorial perivascular perineural position.  After negative aspiration LA injected with ease and visualized spreading within the AC.

## 2019-12-19 NOTE — ANESTHESIA POSTPROCEDURE EVALUATION
Patient: Sobeida Valle    Procedure Summary     Date:  12/19/19 Room / Location:   OR 06 / SURGERY [facility]    Anesthesia Start:  1348 Anesthesia Stop:  1512    Procedure:  RECONSTRUCTION, KNEE, ACL, ARTHROSCOPIC- WITH ALLOGRAFT (Right Knee) Diagnosis:  (SPRAIN OF ANTERIOR CRUCIATE LIGAMENT OF RIGHT KNEE)    Surgeon:  Mika Beach M.D. Responsible Provider:  Milan Ludwig M.D.    Anesthesia Type:  general, peripheral nerve block ASA Status:  2          Final Anesthesia Type: general, peripheral nerve block  Last vitals  BP   Blood Pressure: (P) 102/51, NIBP: 140/87    Temp   (P) 36.4 °C (97.5 °F)    Pulse   Pulse: (P) 73   Resp   (P) 16    SpO2   (P) 95 %      Anesthesia Post Evaluation    Patient location during evaluation: PACU  Patient participation: complete - patient participated  Level of consciousness: sleepy but conscious  Pain score: 4    Airway patency: patent  Anesthetic complications: no  Cardiovascular status: hemodynamically stable  Respiratory status: acceptable  Hydration status: euvolemic    PONV: none

## 2019-12-20 NOTE — OR NURSING
1735: D/Kole to care of family post uneventful stay in PACU 2.Pt demonstrates NWB ambulation with own crutches prior to d/c home.

## 2019-12-20 NOTE — DISCHARGE INSTRUCTIONS
ACTIVITY: Rest and take it easy for the first 24 hours.  A responsible adult is recommended to remain with you during that time.  It is normal to feel sleepy.  We encourage you to not do anything that requires balance, judgment or coordination.    MILD FLU-LIKE SYMPTOMS ARE NORMAL. YOU MAY EXPERIENCE GENERALIZED MUSCLE ACHES, THROAT IRRITATION, HEADACHE AND/OR SOME NAUSEA.    FOR 24 HOURS DO NOT:  Drive, operate machinery or run household appliances.  Drink beer or alcoholic beverages.   Make important decisions or sign legal documents.    SPECIAL INSTRUCTIONS: Ice. Toe touch weight bearing right lower extremity for 3 weeks. Use crutches when ambulating until follow up appointment.  Brace.  Physical therapy.    Call for questions or concerns.  F/u appt as scheduled.    DIET: To avoid nausea, slowly advance diet as tolerated, avoiding spicy or greasy foods for the first day.  Add more substantial food to your diet according to your physician's instructions.  Babies can be fed formula or breast milk as soon as they are hungry.  INCREASE FLUIDS AND FIBER TO AVOID CONSTIPATION.    SURGICAL DRESSING/BATHING: Keep dressings on and dry for five days then may change to band-aids.  May shower after dressings removed.    FOLLOW-UP APPOINTMENT:  A follow-up appointment should be arranged with your doctor. Call to schedule.    You should CALL YOUR PHYSICIAN if you develop:  Fever greater than 101 degrees F.  Pain not relieved by medication, or persistent nausea or vomiting.  Excessive bleeding (blood soaking through dressing) or unexpected drainage from the wound.  Extreme redness or swelling around the incision site, drainage of pus or foul smelling drainage.  Inability to urinate or empty your bladder within 8 hours.  Problems with breathing or chest pain.    You should call 911 if you develop problems with breathing or chest pain.  If you are unable to contact your doctor or surgical center, you should go to the nearest  emergency room or urgent care center.  Physician's telephone #: [phone]    If any questions arise, call your doctor.  If your doctor is not available, please feel free to call the Surgical Center at [phone].  The Center is open Monday through Friday from 7AM to 7PM.  You can also call the HEALTH HOTLINE open 24 hours/day, 7 days/week and speak to a nurse at [phone], or toll free at [phone].    A registered nurse may call you a few days after your surgery to see how you are doing after your procedure.    MEDICATIONS: Resume taking daily medication.  Take prescribed pain medication with food.  If no medication is prescribed, you may take non-aspirin pain medication if needed.  PAIN MEDICATION CAN BE VERY CONSTIPATING.  Take a stool softener or laxative such as senokot, pericolace, or milk of magnesia if needed.    Prescription given for Percocet.  Last pain medication given at 3:30pm on Thursday.    If your physician has prescribed pain medication that includes Acetaminophen (Tylenol), do not take additional Acetaminophen (Tylenol) while taking the prescribed medication.    Depression / Suicide Risk    As you are discharged from this [facility] facility, it is important to learn how to keep safe from harming yourself.    Recognize the warning signs:  · Abrupt changes in personality, positive or negative- including increase in energy   · Giving away possessions  · Change in eating patterns- significant weight changes-  positive or negative  · Change in sleeping patterns- unable to sleep or sleeping all the time   · Unwillingness or inability to communicate  · Depression  · Unusual sadness, discouragement and loneliness  · Talk of wanting to die  · Neglect of personal appearance   · Rebelliousness- reckless behavior  · Withdrawal from people/activities they love  · Confusion- inability to concentrate     If you or a loved one observes any of these behaviors or has concerns about self-harm,  here's what you can do:  · Talk about it- your feelings and reasons for harming yourself  · Remove any means that you might use to hurt yourself (examples: pills, rope, extension cords, firearm)  · Get professional help from the community (Mental Health, Substance Abuse, psychological counseling)  · Do not be alone:Call your Safe Contact- someone whom you trust who will be there for you.  · Call your local CRISIS HOTLINE 275-6757 or 576-466-6932  · Call your local Children's Mobile Crisis Response Team [place] (727) 099-2613 or wwwWipit  · Call the toll free National Suicide Prevention Hotlines   · National Suicide Prevention Lifeline 829-685-EAGK (5666)  [place] [employer] [facility] 800-SUICIDE (279-3532)    Peripheral Nerve Block Discharge Instructions from Same Day Surgery and Inpatient :    What to Expect - Lower Extremity  · The block may cause you to experience numbness and weakness in your {LEG LOCATION PNB:284391} on the same side as your surgery  · Numbness, tingling and / or weakness are all normal. For some people, this may be an unpleasant sensation  · These issues will be resolved when the local anesthetic wears off   · You may experience numbness and tingling in your thigh on the same side as your surgery if the block medicine was injected at your groin area  · Numbness will make it difficult to walk  · You may have problems with balance and walking so be very careful   · Follow your surgeon's direction regarding weight bearing on your surgical limb  · Be very careful with your numb limb  Precautions  · The numbness may affect your balance  · Be careful when walking or moving around  · Your leg may be weak: be very careful putting weight on it  · If your surgeon did not specify a time, you should not bear weight for 24 hours  · Be sure to ask for help when you need it  · It is better to have help than to fall and hurt yourself  What to Expect - Upper Extremity  · You may experience  "numbness and weakness in {ARM LOCATION PNB:328135}  on the same side as your surgery  · This is normal. For some people, this may be an unpleasant sensation. Be very careful with your numb limb  · Ask for help when you need it  Shoulder Surgery Side Effects  · In addition to numbness and weakness you may experience other symptoms  · Other nerves that are close to those nerves injected can also be affected by local anesthesia  · You may experience a hoarseness in your voice  · Your breathing may feel different  · You may also notice drooping of your eyelid, pupil constriction, and decreased sweating, on the side of your surgery  · All of these side effects are normal and will resolve when the local anesthetic wears off   Prevent Injury  · Protect the limb like a baby  · Beware of exposing your limb to extreme heat or cold or trauma  · The limb may be injured without you noticing because it is numb  · Keep the limb elevated whenever possible  · Do not sleep on the limb  · Change the position of the limb regularly  · Avoid putting pressure on your surgical limb  Pain Control  · The initial block on the day of surgery will make your extremity feel \"numb\"  · Any consecutive injection including prior to discharge from the hospital will make your extremity feel \"numb\"  · You may feel an aching or burning when the local anesthesia starts to wear off  · Take pain pills as prescribed by your surgeon  · Call your surgeon or anesthesiologist if you do not have adequate pain control  ·   "

## 2019-12-20 NOTE — OP REPORT
DATE OF SERVICE: 12/19/19    PREOPERATIVE DIAGNOSIS: right  Knee anterior cruciate ligament tear and lateral meniscus tear.     POSTOPERATIVE DIAGNOSIS: right Knee anterior cruciate ligament tear and lateral meniscus tear.     PROCEDURE PERFORMED:  1.  right knee arthroscopic anterior cruciate ligament reconstruction.   2. Arthroscopic lateral meniscus repair    SURGEON: Mika Beach MD    ASSISTANT: AFSANEH Lynne    ANESTHESIA: General with adductor canal femoral nerve block for postoperative pain control.    ANESTHESIOLOGIST: Oziel    ANTIBIOTICS: Ancef    COMPLICATIONS: None.     IMPLANTS: Peroneus longus allograft. 15 coninuous loop Smith [employer] Nephew Endobutton, a 10 x 25 Biosure Smith [employer] Nephew screw with the footprint and 3 fastfix meniscus repair sutures    INDICATIONS: The patient presents with right knee after an acute injury. The patient has a complete ACL tear. Also noted on MRI imaging is a small medial meniscus tear. Options were discussed, including both non operative and operative treatments. We discussed the advantages and disadvantages of both allograft and autograft for reconstruction of ACL. After further discussion, the patient elected to proceed with an allograft tendon.  Risks of surgery were discussed at length. All questions were answered. No guarantees were given.     PROCEDURE IN DETAIL: The patient was properly identified in the preoperative holding area, and the right knee was marked as the correct surgical site. The patient was then taken back to the operative room, and placed supine on the operating room table and underwent general anesthesia. The right lower extremity was sterilely prepped and draped in standard fashion. The limb was exsanguinated and the tourniquet was inflated. A standard anterolateral portal was created. The scope was placed up the into the suprapatellar pouch. The patella looked good. No chondromalacia changes were noted. The trochlear groove also looked  good. The medial and lateral gutters were visualized, and no loose bodies were noted. The medial compartment showed looked good without a meniscus tear. There was also evidence of a complete ACL tear. The graft was prepared on the back table. An anterior medial portal was created.  The PCL looked good.. There were no lateral compartment chondromalacia changes.  There was a posterior horn lateral meniscus tear that I felt was repairable so the fastfix suture meniscus repair was performed with going through the lateral portal and then through the medial portal.  This was repaired and stable with the probe.   A notchplasty was performed and then the center of the ACL footprint was identified. A 55-degree angled guide was placed in the center of the ACL footprint. This was drilled and then a 6 straight followed by a 10 straight reamer was used and any fragments of bone were removed. The 7 mm offset guide was placed in the posterior lateral femoral side at the 10:30 o'clock position and this was drilled and then the Endobutton drill was used, measured and then the 10 acorn was used for 32 mm in depth. This was all done through the anterior medial portal. The suture was attached to the guidewire. It was pulled through the anterior medial postal and then back down through the transtibial portal. The graft with the Endobutton was pulled through. This was flipped, toggled nicely back and forth. Flexion and extension was done 20 times. Then the 10 x 25 Biosure screw was placed followed by a footprint.  This was irrigated.  The graft looked excellent, without impingement and with good tension. It was irrigated, excess fluid was drained, and then the skin was closed with 2-0 Vicryl and 3-0 nylon on the skin. A total of 30 ml of ropivacaine was injected. Soft dressings were applied. A hinged knee brace was placed. The patient was extubated and transferred to the recovery room in stable condition.

## 2020-12-08 ENCOUNTER — OFFICE VISIT (OUTPATIENT)
Dept: URGENT CARE | Facility: CLINIC | Age: 30
End: 2020-12-08
Payer: COMMERCIAL

## 2020-12-08 VITALS
OXYGEN SATURATION: 97 % | SYSTOLIC BLOOD PRESSURE: 118 MMHG | TEMPERATURE: 97.6 F | WEIGHT: 208 LBS | HEIGHT: 67 IN | RESPIRATION RATE: 17 BRPM | HEART RATE: 93 BPM | BODY MASS INDEX: 32.65 KG/M2 | DIASTOLIC BLOOD PRESSURE: 82 MMHG

## 2020-12-08 DIAGNOSIS — L03.112 CELLULITIS OF AXILLA, LEFT: ICD-10-CM

## 2020-12-08 DIAGNOSIS — L02.91 ABSCESS: ICD-10-CM

## 2020-12-08 PROCEDURE — 10060 I&D ABSCESS SIMPLE/SINGLE: CPT | Performed by: FAMILY MEDICINE

## 2020-12-08 RX ORDER — SULFAMETHOXAZOLE AND TRIMETHOPRIM 800; 160 MG/1; MG/1
1 TABLET ORAL 2 TIMES DAILY
Qty: 14 TAB | Refills: 0 | Status: SHIPPED | OUTPATIENT
Start: 2020-12-08 | End: 2020-12-15

## 2020-12-08 ASSESSMENT — ENCOUNTER SYMPTOMS
COUGH: 0
FEVER: 0
SHORTNESS OF BREATH: 0
SORE THROAT: 0
VOMITING: 0

## 2020-12-09 NOTE — PROGRESS NOTES
Subjective:     Sobeida Valle is a 30 y.o. female who presents for Bump (x3days ago first noticed, red and hurts, rash surrounding the area, inflammed)    HPI  Pt presents for evaluation of a new problem  Pt with bump in the left axillary area   Has noticed some erythema in the axillary area  Area has a central area of swelling which is slowly growing  Erythema is spreading  Area is becoming more painful  Tried to put a needle in the lump herself, however did not get much fluid out    Review of Systems   Constitutional: Negative for fever.   HENT: Negative for sore throat.    Respiratory: Negative for cough and shortness of breath.    Gastrointestinal: Negative for vomiting.   Skin: Positive for rash.       PMH:  has a past medical history of Asthma and Pain.  MEDS:   Current Outpatient Medications:   •  sulfamethoxazole-trimethoprim (BACTRIM DS) 800-160 MG tablet, Take 1 Tab by mouth 2 times a day for 7 days., Disp: 14 Tab, Rfl: 0  •  ibuprofen (MOTRIN) 200 MG Tab, Take 200 mg by mouth every 6 hours as needed., Disp: , Rfl:   •  Omega-3 Fatty Acids (FISH OIL PO), Take  by mouth every day., Disp: , Rfl:   •  acetaminophen (TYLENOL) 325 MG Tab, Take 650 mg by mouth every four hours as needed., Disp: , Rfl:   •  albuterol 108 (90 Base) MCG/ACT Aero Soln inhalation aerosol, Inhale 2 Puffs by mouth every 6 hours as needed for Shortness of Breath., Disp: 8.5 g, Rfl: 0  ALLERGIES:   Allergies   Allergen Reactions   • Sudafed      Elevated blood pressure     SURGHX:   Past Surgical History:   Procedure Laterality Date   • PB KNEE SCOPE,AID ANT CRUCIATE REPAIR Right 12/19/2019    Procedure: RECONSTRUCTION, KNEE, ACL, ARTHROSCOPIC- WITH ALLOGRAFT;  Surgeon: Mika Beach M.D.;  Location: SURGERY [facility];  Service: Orthopedics   • OTHER Bilateral 07/2018    Lasik procedure   • DENTAL EXTRACTION(S)  2003    wisdom teeth     SOCHX:  reports that she quit smoking about 6 years ago. Her smoking use included  "cigarettes. She has a 0.30 pack-year smoking history. She has never used smokeless tobacco. She reports current alcohol use. She reports that she does not use drugs.  FH: Family history was reviewed, not contributing to acute infection     Objective:   /82 (BP Location: Left arm, Patient Position: Sitting, BP Cuff Size: Adult)   Pulse 93   Temp 36.4 °C (97.6 °F) (Temporal)   Resp 17   Ht 1.689 m (5' 6.5\")   Wt 94.3 kg (208 lb)   SpO2 97%   BMI 33.07 kg/m²     Physical Exam  Constitutional:       General: She is not in acute distress.     Appearance: She is well-developed. She is not diaphoretic.   HENT:      Head: Normocephalic and atraumatic.   Pulmonary:      Effort: Pulmonary effort is normal.   Skin:     General: Skin is warm and dry.      Comments:   1 cm x 1 cm area of fluctuance surrounded by approximately 8 cm x 4 cm area of erythema in the left axillary area   Neurological:      Mental Status: She is alert and oriented to person, place, and time.   Psychiatric:         Behavior: Behavior normal.         Thought Content: Thought content normal.         Judgment: Judgment normal.     Incision and drainage  Left axilla cleaned with Betadine and anesthetized with 1% lidocaine without epinephrine.  Using an 11 blade, 0.5 cm incision made and approximately 1 cc of purulent and bloody fluid drained.  Area was dressed with sterile bandages.  Patient tolerated procedure well and had no immediate complications.    Assessment/Plan:   Assessment    1. Abscess    2. Cellulitis of axilla, left  - sulfamethoxazole-trimethoprim (BACTRIM DS) 800-160 MG tablet; Take 1 Tab by mouth 2 times a day for 7 days.  Dispense: 14 Tab; Refill: 0    Patient with cellulitis and abscess of axillary area.  Incision and drainage performed and tolerated well.  Will start on Bactrim and follow-up on an as-needed basis.    "

## 2021-02-25 ENCOUNTER — HOSPITAL ENCOUNTER (OUTPATIENT)
Dept: LAB | Facility: MEDICAL CENTER | Age: 31
End: 2021-02-25
Attending: OBSTETRICS & GYNECOLOGY
Payer: COMMERCIAL

## 2021-02-25 LAB
B-HCG SERPL-ACNC: <0.5 MIU/ML (ref 0–10)
BASOPHILS # BLD AUTO: 0.2 % (ref 0–1.8)
BASOPHILS # BLD: 0.02 K/UL (ref 0–0.12)
EOSINOPHIL # BLD AUTO: 0.45 K/UL (ref 0–0.51)
EOSINOPHIL NFR BLD: 5.6 % (ref 0–6.9)
ERYTHROCYTE [DISTWIDTH] IN BLOOD BY AUTOMATED COUNT: 41 FL (ref 35.9–50)
EST. AVERAGE GLUCOSE BLD GHB EST-MCNC: 117 MG/DL
HBA1C MFR BLD: 5.7 % (ref 4–5.6)
HCT VFR BLD AUTO: 39.4 % (ref 37–47)
HGB BLD-MCNC: 12.4 G/DL (ref 12–16)
IMM GRANULOCYTES # BLD AUTO: 0.03 K/UL (ref 0–0.11)
IMM GRANULOCYTES NFR BLD AUTO: 0.4 % (ref 0–0.9)
LYMPHOCYTES # BLD AUTO: 2.09 K/UL (ref 1–4.8)
LYMPHOCYTES NFR BLD: 25.9 % (ref 22–41)
MCH RBC QN AUTO: 25.6 PG (ref 27–33)
MCHC RBC AUTO-ENTMCNC: 31.5 G/DL (ref 33.6–35)
MCV RBC AUTO: 81.4 FL (ref 81.4–97.8)
MONOCYTES # BLD AUTO: 0.34 K/UL (ref 0–0.85)
MONOCYTES NFR BLD AUTO: 4.2 % (ref 0–13.4)
NEUTROPHILS # BLD AUTO: 5.15 K/UL (ref 2–7.15)
NEUTROPHILS NFR BLD: 63.7 % (ref 44–72)
NRBC # BLD AUTO: 0 K/UL
NRBC BLD-RTO: 0 /100 WBC
PLATELET # BLD AUTO: 309 K/UL (ref 164–446)
PMV BLD AUTO: 9.8 FL (ref 9–12.9)
RBC # BLD AUTO: 4.84 M/UL (ref 4.2–5.4)
T4 FREE SERPL-MCNC: 1.16 NG/DL (ref 0.93–1.7)
TSH SERPL DL<=0.005 MIU/L-ACNC: 1.33 UIU/ML (ref 0.38–5.33)
WBC # BLD AUTO: 8.1 K/UL (ref 4.8–10.8)

## 2021-02-25 PROCEDURE — 84443 ASSAY THYROID STIM HORMONE: CPT

## 2021-02-25 PROCEDURE — 83036 HEMOGLOBIN GLYCOSYLATED A1C: CPT

## 2021-02-25 PROCEDURE — 84403 ASSAY OF TOTAL TESTOSTERONE: CPT

## 2021-02-25 PROCEDURE — 82306 VITAMIN D 25 HYDROXY: CPT

## 2021-02-25 PROCEDURE — 82670 ASSAY OF TOTAL ESTRADIOL: CPT

## 2021-02-25 PROCEDURE — 84702 CHORIONIC GONADOTROPIN TEST: CPT

## 2021-02-25 PROCEDURE — 84439 ASSAY OF FREE THYROXINE: CPT

## 2021-02-25 PROCEDURE — 84270 ASSAY OF SEX HORMONE GLOBUL: CPT

## 2021-02-25 PROCEDURE — 83002 ASSAY OF GONADOTROPIN (LH): CPT

## 2021-02-25 PROCEDURE — 84402 ASSAY OF FREE TESTOSTERONE: CPT

## 2021-02-25 PROCEDURE — 36415 COLL VENOUS BLD VENIPUNCTURE: CPT

## 2021-02-25 PROCEDURE — 85025 COMPLETE CBC W/AUTO DIFF WBC: CPT

## 2021-02-25 PROCEDURE — 83001 ASSAY OF GONADOTROPIN (FSH): CPT

## 2021-02-26 LAB
25(OH)D3 SERPL-MCNC: 23 NG/ML (ref 30–100)
ESTRADIOL SERPL-MCNC: 20.7 PG/ML
FSH SERPL-ACNC: 4.6 MIU/ML
LH SERPL-ACNC: 3.3 IU/L

## 2021-03-04 LAB
SHBG SERPL-SCNC: 16 NMOL/L (ref 30–135)
TESTOST FREE SERPL-MCNC: 3.7 PG/ML (ref 1.3–9.2)
TESTOST SERPL-MCNC: 16 NG/DL (ref 9–55)

## 2021-04-21 ENCOUNTER — IMMUNIZATION (OUTPATIENT)
Dept: FAMILY PLANNING/WOMEN'S HEALTH CLINIC | Facility: IMMUNIZATION CENTER | Age: 31
End: 2021-04-21
Payer: COMMERCIAL

## 2021-04-21 DIAGNOSIS — Z23 ENCOUNTER FOR VACCINATION: Primary | ICD-10-CM

## 2021-04-21 PROCEDURE — 0001A PFIZER SARS-COV-2 VACCINE: CPT | Performed by: INTERNAL MEDICINE

## 2021-04-21 PROCEDURE — 91300 PFIZER SARS-COV-2 VACCINE: CPT | Performed by: INTERNAL MEDICINE

## 2021-05-14 ENCOUNTER — IMMUNIZATION (OUTPATIENT)
Dept: FAMILY PLANNING/WOMEN'S HEALTH CLINIC | Facility: IMMUNIZATION CENTER | Age: 31
End: 2021-05-14
Payer: COMMERCIAL

## 2021-05-14 DIAGNOSIS — Z23 ENCOUNTER FOR VACCINATION: Primary | ICD-10-CM

## 2021-05-14 PROCEDURE — 91300 PFIZER SARS-COV-2 VACCINE: CPT | Performed by: INTERNAL MEDICINE

## 2021-05-14 PROCEDURE — 0002A PFIZER SARS-COV-2 VACCINE: CPT | Performed by: INTERNAL MEDICINE

## 2021-10-02 ENCOUNTER — OFFICE VISIT (OUTPATIENT)
Dept: URGENT CARE | Facility: CLINIC | Age: 31
End: 2021-10-02
Payer: COMMERCIAL

## 2021-10-02 VITALS
DIASTOLIC BLOOD PRESSURE: 80 MMHG | OXYGEN SATURATION: 99 % | HEART RATE: 66 BPM | BODY MASS INDEX: 31.82 KG/M2 | TEMPERATURE: 97.7 F | RESPIRATION RATE: 16 BRPM | WEIGHT: 198 LBS | SYSTOLIC BLOOD PRESSURE: 124 MMHG | HEIGHT: 66 IN

## 2021-10-02 DIAGNOSIS — S80.862A INSECT BITE OF LEFT LOWER LEG WITH INFECTION, INITIAL ENCOUNTER: ICD-10-CM
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
## 2021-10-02 DIAGNOSIS — L08.9 INSECT BITE OF LEFT LOWER LEG WITH INFECTION, INITIAL ENCOUNTER: ICD-10-CM

## 2021-10-02 PROCEDURE — 99213 OFFICE O/P EST LOW 20 MIN: CPT | Performed by: NURSE PRACTITIONER

## 2021-10-02 RX ORDER — DOXYCYCLINE HYCLATE 100 MG
100 TABLET ORAL 2 TIMES DAILY
Qty: 14 TABLET | Refills: 0 | Status: SHIPPED
Start: 2021-10-02 | End: 2022-05-25

## 2021-10-02 RX ORDER — PREDNISONE 20 MG/1
TABLET ORAL
Qty: 3 TABLET | Refills: 0 | Status: SHIPPED
Start: 2021-10-02 | End: 2022-05-25

## 2021-10-02 ASSESSMENT — ENCOUNTER SYMPTOMS
HEADACHES: 0
MYALGIAS: 0
CHILLS: 0
FEVER: 0
NAUSEA: 0
TINGLING: 0

## 2021-10-02 NOTE — PROGRESS NOTES
Subjective     Sobeida Valle is a 31 y.o. female who presents with Insect Bite (began wed, loacted left calf/leg, red, swollen, staph infection concern )            HPI   New problem.  Patient is a 31-year-old female who presents with insect bite to the lateral aspect of her left calf on Wednesday.  She reports it has become increasingly erythematous with mild swelling and heat.  She reports that she has had been in this position before with insect bites that have gotten infected and the last time required her to get to antibiotic shots in the clinic.  She denies fever, chills, nausea, myalgia, or headache.    Pseudoephedrine hcl and Sudafed  Current Outpatient Medications on File Prior to Visit   Medication Sig Dispense Refill   • ibuprofen (MOTRIN) 200 MG Tab Take 200 mg by mouth every 6 hours as needed.     • acetaminophen (TYLENOL) 325 MG Tab Take 650 mg by mouth every four hours as needed.     • albuterol 108 (90 Base) MCG/ACT Aero Soln inhalation aerosol Inhale 2 Puffs by mouth every 6 hours as needed for Shortness of Breath. 8.5 g 0   • Omega-3 Fatty Acids (FISH OIL PO) Take  by mouth every day.       No current facility-administered medications on file prior to visit.     Social History     Socioeconomic History   • Marital status:      Spouse name: Not on file   • Number of children: Not on file   • Years of education: Not on file   • Highest education level: Not on file   Occupational History   • Not on file   Tobacco Use   • Smoking status: Former Smoker     Packs/day: 0.10     Years: 3.00     Pack years: 0.30     Types: Cigarettes     Quit date:      Years since quittin.7   • Smokeless tobacco: Never Used   Vaping Use   • Vaping Use: Never used   Substance and Sexual Activity   • Alcohol use: Yes     Comment: one drink per month   • Drug use: Never   • Sexual activity: Not on file   Other Topics Concern   • Not on file   Social History Narrative   • Not on file     Social Determinants  "of Health     Financial Resource Strain:    • Difficulty of Paying Living Expenses:    Food Insecurity:    • Worried About Running Out of Food in the Last Year:    • Ran Out of Food in the Last Year:    Transportation Needs:    • Lack of Transportation (Medical):    • Lack of Transportation (Non-Medical):    Physical Activity:    • Days of Exercise per Week:    • Minutes of Exercise per Session:    Stress:    • Feeling of Stress :    Social Connections:    • Frequency of Communication with Friends and Family:    • Frequency of Social Gatherings with Friends and Family:    • Attends Rastaf[employer] Services:    • Active Member of Clubs or Organizations:    • Attends Club or Organization Meetings:    • Marital Status:    Intimate Partner Violence:    • Fear of Current or Ex-Partner:    • Emotionally Abused:    • Physically Abused:    • Sexually Abused:      Breast Cancer-related family history is not on file.      Review of Systems   Constitutional: Negative for chills and fever.   Gastrointestinal: Negative for nausea.   Musculoskeletal: Negative for myalgias.   Skin:        Insect bite with erythema     Neurological: Negative for tingling and headaches.              Objective     /80 (BP Location: Left arm, Patient Position: Sitting, BP Cuff Size: Adult)   Pulse 66   Temp 36.5 °C (97.7 °F) (Temporal)   Resp 16   Ht 1.676 m (5' 6\")   Wt 89.8 kg (198 lb)   SpO2 99%   BMI 31.96 kg/m²      Physical Exam  Vitals and nursing note reviewed.   Constitutional:       Appearance: Normal appearance.   Cardiovascular:      Rate and Rhythm: Normal rate and regular rhythm.      Heart sounds: No murmur heard.     Pulmonary:      Effort: Pulmonary effort is normal.      Breath sounds: Normal breath sounds.   Musculoskeletal:         General: Normal range of motion.   Skin:     General: Skin is warm and dry.      Findings: Erythema present.      Comments: Patient with obvious insect bite to the lateral aspect of her left calf. "  She has a moderate sized area of erythema around this bite with increased warmth to the skin.  There is no induration or swelling.   Neurological:      General: No focal deficit present.      Mental Status: She is alert and oriented to person, place, and time.   Psychiatric:         Mood and Affect: Mood normal.         Behavior: Behavior normal.                             Assessment & Plan        1. Insect bite of left lower leg with infection, initial encounter  doxycycline (VIBRAMYCIN) 100 MG Tab    predniSONE (DELTASONE) 20 MG Tab     Infection versus allergic reaction.  Doxycycline and prednisone.  Differential diagnosis, natural history, supportive care, and indications for immediate follow-up discussed at length.

## 2022-05-24 ENCOUNTER — TELEPHONE (OUTPATIENT)
Dept: HEALTH INFORMATION MANAGEMENT | Facility: OTHER | Age: 32
End: 2022-05-24

## 2022-05-24 SDOH — ECONOMIC STABILITY: HOUSING INSECURITY: IN THE LAST 12 MONTHS, HOW MANY PLACES HAVE YOU LIVED?: 1

## 2022-05-24 SDOH — ECONOMIC STABILITY: TRANSPORTATION INSECURITY
IN THE PAST 12 MONTHS, HAS THE LACK OF TRANSPORTATION KEPT YOU FROM MEDICAL APPOINTMENTS OR FROM GETTING MEDICATIONS?: NO

## 2022-05-24 SDOH — ECONOMIC STABILITY: INCOME INSECURITY: HOW HARD IS IT FOR YOU TO PAY FOR THE VERY BASICS LIKE FOOD, HOUSING, MEDICAL CARE, AND HEATING?: NOT VERY HARD

## 2022-05-24 SDOH — HEALTH STABILITY: MENTAL HEALTH
STRESS IS WHEN SOMEONE FEELS TENSE, NERVOUS, ANXIOUS, OR CAN'T SLEEP AT NIGHT BECAUSE THEIR MIND IS TROUBLED. HOW STRESSED ARE YOU?: RATHER MUCH

## 2022-05-24 SDOH — ECONOMIC STABILITY: HOUSING INSECURITY
IN THE LAST 12 MONTHS, WAS THERE A TIME WHEN YOU DID NOT HAVE A STEADY PLACE TO SLEEP OR SLEPT IN A SHELTER (INCLUDING NOW)?: NO

## 2022-05-24 SDOH — ECONOMIC STABILITY: FOOD INSECURITY: WITHIN THE PAST 12 MONTHS, THE FOOD YOU BOUGHT JUST DIDN'T LAST AND YOU DIDN'T HAVE MONEY TO GET MORE.: NEVER TRUE

## 2022-05-24 SDOH — ECONOMIC STABILITY: FOOD INSECURITY: WITHIN THE PAST 12 MONTHS, YOU WORRIED THAT YOUR FOOD WOULD RUN OUT BEFORE YOU GOT MONEY TO BUY MORE.: NEVER TRUE

## 2022-05-24 SDOH — HEALTH STABILITY: PHYSICAL HEALTH: ON AVERAGE, HOW MANY DAYS PER WEEK DO YOU ENGAGE IN MODERATE TO STRENUOUS EXERCISE (LIKE A BRISK WALK)?: 2 DAYS

## 2022-05-24 SDOH — HEALTH STABILITY: PHYSICAL HEALTH: ON AVERAGE, HOW MANY MINUTES DO YOU ENGAGE IN EXERCISE AT THIS LEVEL?: 60 MIN

## 2022-05-24 SDOH — ECONOMIC STABILITY: INCOME INSECURITY: IN THE LAST 12 MONTHS, WAS THERE A TIME WHEN YOU WERE NOT ABLE TO PAY THE MORTGAGE OR RENT ON TIME?: NO

## 2022-05-24 SDOH — ECONOMIC STABILITY: TRANSPORTATION INSECURITY
IN THE PAST 12 MONTHS, HAS LACK OF RELIABLE TRANSPORTATION KEPT YOU FROM MEDICAL APPOINTMENTS, MEETINGS, WORK OR FROM GETTING THINGS NEEDED FOR DAILY LIVING?: NO

## 2022-05-24 SDOH — ECONOMIC STABILITY: TRANSPORTATION INSECURITY
IN THE PAST 12 MONTHS, HAS LACK OF TRANSPORTATION KEPT YOU FROM MEETINGS, WORK, OR FROM GETTING THINGS NEEDED FOR DAILY LIVING?: NO

## 2022-05-24 ASSESSMENT — SOCIAL DETERMINANTS OF HEALTH (SDOH)
HOW OFTEN DO YOU GET TOGETHER WITH FRIENDS OR RELATIVES?: ONCE A WEEK
HOW OFTEN DO YOU ATTEND CHURCH OR RELIGIOUS SERVICES?: NEVER
HOW HARD IS IT FOR YOU TO PAY FOR THE VERY BASICS LIKE FOOD, HOUSING, MEDICAL CARE, AND HEATING?: NOT VERY HARD
HOW MANY DRINKS CONTAINING ALCOHOL DO YOU HAVE ON A TYPICAL DAY WHEN YOU ARE DRINKING: 1 OR 2
HOW OFTEN DO YOU HAVE A DRINK CONTAINING ALCOHOL: MONTHLY OR LESS
WITHIN THE PAST 12 MONTHS, YOU WORRIED THAT YOUR FOOD WOULD RUN OUT BEFORE YOU GOT THE MONEY TO BUY MORE: NEVER TRUE
IN A TYPICAL WEEK, HOW MANY TIMES DO YOU TALK ON THE PHONE WITH FAMILY, FRIENDS, OR NEIGHBORS?: ONCE A WEEK
HOW OFTEN DO YOU ATTEND CHURCH OR RELIGIOUS SERVICES?: NEVER
HOW OFTEN DO YOU ATTENT MEETINGS OF THE CLUB OR ORGANIZATION YOU BELONG TO?: NEVER
HOW OFTEN DO YOU HAVE SIX OR MORE DRINKS ON ONE OCCASION: NEVER
DO YOU BELONG TO ANY CLUBS OR ORGANIZATIONS SUCH AS CHURCH GROUPS UNIONS, FRATERNAL OR ATHLETIC GROUPS, OR SCHOOL GROUPS?: NO
DO YOU BELONG TO ANY CLUBS OR ORGANIZATIONS SUCH AS CHURCH GROUPS UNIONS, FRATERNAL OR ATHLETIC GROUPS, OR SCHOOL GROUPS?: NO
IN A TYPICAL WEEK, HOW MANY TIMES DO YOU TALK ON THE PHONE WITH FAMILY, FRIENDS, OR NEIGHBORS?: ONCE A WEEK
HOW OFTEN DO YOU GET TOGETHER WITH FRIENDS OR RELATIVES?: ONCE A WEEK
HOW OFTEN DO YOU ATTENT MEETINGS OF THE CLUB OR ORGANIZATION YOU BELONG TO?: NEVER

## 2022-05-24 ASSESSMENT — LIFESTYLE VARIABLES
AUDIT-C TOTAL SCORE: 1
HOW OFTEN DO YOU HAVE A DRINK CONTAINING ALCOHOL: MONTHLY OR LESS
SKIP TO QUESTIONS 9-10: 1
HOW OFTEN DO YOU HAVE SIX OR MORE DRINKS ON ONE OCCASION: NEVER
HOW MANY STANDARD DRINKS CONTAINING ALCOHOL DO YOU HAVE ON A TYPICAL DAY: 1 OR 2

## 2022-05-25 ENCOUNTER — OFFICE VISIT (OUTPATIENT)
Dept: MEDICAL GROUP | Facility: PHYSICIAN GROUP | Age: 32
End: 2022-05-25
Payer: COMMERCIAL

## 2022-05-25 VITALS
TEMPERATURE: 98 F | SYSTOLIC BLOOD PRESSURE: 116 MMHG | OXYGEN SATURATION: 97 % | HEART RATE: 76 BPM | WEIGHT: 201.8 LBS | HEIGHT: 67 IN | RESPIRATION RATE: 16 BRPM | DIASTOLIC BLOOD PRESSURE: 72 MMHG | BODY MASS INDEX: 31.67 KG/M2

## 2022-05-25 DIAGNOSIS — E28.2 PCOS (POLYCYSTIC OVARIAN SYNDROME): ICD-10-CM

## 2022-05-25 DIAGNOSIS — F33.0 MILD EPISODE OF RECURRENT MAJOR DEPRESSIVE DISORDER (HCC): ICD-10-CM

## 2022-05-25 DIAGNOSIS — F41.9 ANXIETY: ICD-10-CM

## 2022-05-25 PROBLEM — F32.9 MAJOR DEPRESSIVE DISORDER: Status: ACTIVE | Noted: 2022-05-25

## 2022-05-25 PROCEDURE — 99214 OFFICE O/P EST MOD 30 MIN: CPT

## 2022-05-25 RX ORDER — HYDROXYZINE HYDROCHLORIDE 25 MG/1
25 TABLET, FILM COATED ORAL 3 TIMES DAILY PRN
Qty: 30 TABLET | Refills: 0 | Status: SHIPPED | OUTPATIENT
Start: 2022-05-25 | End: 2022-06-24 | Stop reason: SDUPTHER

## 2022-05-25 RX ORDER — HYDROXYZINE HYDROCHLORIDE 25 MG/1
25 TABLET, FILM COATED ORAL 3 TIMES DAILY PRN
Qty: 30 TABLET | Refills: 0 | Status: SHIPPED
Start: 2022-05-25 | End: 2022-05-25

## 2022-05-25 RX ORDER — ESCITALOPRAM OXALATE 10 MG/1
10 TABLET ORAL DAILY
Qty: 30 TABLET | Refills: 3 | Status: SHIPPED | OUTPATIENT
Start: 2022-05-25 | End: 2022-06-08 | Stop reason: SDUPTHER

## 2022-05-25 RX ORDER — BLOOD SUGAR DIAGNOSTIC
STRIP MISCELLANEOUS
COMMUNITY
Start: 2022-03-02 | End: 2022-05-25

## 2022-05-25 ASSESSMENT — ANXIETY QUESTIONNAIRES
5. BEING SO RESTLESS THAT IT IS HARD TO SIT STILL: NEARLY EVERY DAY
2. NOT BEING ABLE TO STOP OR CONTROL WORRYING: MORE THAN HALF THE DAYS
1. FEELING NERVOUS, ANXIOUS, OR ON EDGE: MORE THAN HALF THE DAYS
4. TROUBLE RELAXING: NEARLY EVERY DAY
6. BECOMING EASILY ANNOYED OR IRRITABLE: MORE THAN HALF THE DAYS
GAD7 TOTAL SCORE: 18
3. WORRYING TOO MUCH ABOUT DIFFERENT THINGS: NEARLY EVERY DAY
IF YOU CHECKED OFF ANY PROBLEMS ON THIS QUESTIONNAIRE, HOW DIFFICULT HAVE THESE PROBLEMS MADE IT FOR YOU TO DO YOUR WORK, TAKE CARE OF THINGS AT HOME, OR GET ALONG WITH OTHER PEOPLE: SOMEWHAT DIFFICULT
7. FEELING AFRAID AS IF SOMETHING AWFUL MIGHT HAPPEN: NEARLY EVERY DAY

## 2022-05-25 ASSESSMENT — ENCOUNTER SYMPTOMS
WEIGHT LOSS: 0
DEPRESSION: 1
FEVER: 0
INSOMNIA: 1
NERVOUS/ANXIOUS: 1
CHILLS: 0
HALLUCINATIONS: 0

## 2022-05-25 ASSESSMENT — PATIENT HEALTH QUESTIONNAIRE - PHQ9
CLINICAL INTERPRETATION OF PHQ2 SCORE: 3
5. POOR APPETITE OR OVEREATING: 2 - MORE THAN HALF THE DAYS
SUM OF ALL RESPONSES TO PHQ QUESTIONS 1-9: 14

## 2022-05-25 ASSESSMENT — LIFESTYLE VARIABLES: SUBSTANCE_ABUSE: 0

## 2022-05-25 NOTE — ASSESSMENT & PLAN NOTE
Chronic condition currently active  -Lexapro 10 mg daily ordered, discussed ADRs risk and benefits with patient.  Educational handout sent to patient  -Behavioral health referral  -ED precautions given and known for suicidal ideations

## 2022-05-25 NOTE — PROGRESS NOTES
Subjective:     CC:  Diagnoses of Mild episode of recurrent major depressive disorder (HCC), Anxiety, BMI 31.0-31.9,adult, and PCOS (polycystic ovarian syndrome) were pertinent to this visit.    HISTORY OF THE PRESENT ILLNESS: Patient is a 31 y.o. female. This pleasant patient is here today to establish care and discuss the following problems:    Problem   Mild Episode of Recurrent Major Depressive Disorder (Hcc)    -Chronic condition, started during pregnancy 6 years ago.  Feels depression and anxiety daily.  PHQ-9 equals 14  -Denies suicidal ideations  -Has tried to seek out counseling but finding it hard to get into.  - Has not tried any treatments for this condition     Anxiety    Started during pregnancy  -Irrational fears  -Reports night terrors  -Restless, insomnia  GAD7 = 17         Bmi 31.0-31.9,Adult    Chronic condition currently 201 lbs.  Goal weight 160lb.    Eats a healthy diet, exercise is sporadic.  Family history of diabetes, heart disease.  Stopped eating processed foods and sodas.     Pcos (Polycystic Ovarian Syndrome)    This is a condition that was diagnosed last year at her OB/GYN office.  She had a Pap smear that was normal.  They did a transvaginal ultrasound which did show evidence of polycystic ovarian syndrome.  Patient was also told she was slightly prediabetic.  She has completely overhauled her diet and she feels much better.  Periods are back to regular.         Health Maintenance: Completed recommend COVID-19 booster at outside pharmacy of choice    ROS:   Review of Systems   Constitutional: Negative for chills, fever, malaise/fatigue and weight loss.   Psychiatric/Behavioral: Positive for depression. Negative for hallucinations, substance abuse and suicidal ideas. The patient is nervous/anxious and has insomnia.    All other systems reviewed and are negative.        Objective:     Exam: /72 (BP Location: Left arm, Patient Position: Sitting, BP Cuff Size: Adult)   Pulse 76    "Temp 36.7 °C (98 °F) (Temporal)   Resp 16   Ht 1.692 m (5' 6.6\")   Wt 91.5 kg (201 lb 12.8 oz)   SpO2 97%  Body mass index is 31.99 kg/m².    Physical Exam  Constitutional:       General: She is not in acute distress.     Appearance: Normal appearance. She is not ill-appearing or toxic-appearing.   HENT:      Head: Normocephalic.      Right Ear: Tympanic membrane, ear canal and external ear normal. There is impacted cerumen.      Left Ear: Tympanic membrane, ear canal and external ear normal.      Nose: Nose normal.      Mouth/Throat:      Mouth: Mucous membranes are moist.      Pharynx: Oropharynx is clear.   Eyes:      Extraocular Movements: Extraocular movements intact.      Conjunctiva/sclera: Conjunctivae normal.      Pupils: Pupils are equal, round, and reactive to light.   Cardiovascular:      Rate and Rhythm: Normal rate and regular rhythm.      Pulses: Normal pulses.      Heart sounds: Normal heart sounds. No murmur heard.  Pulmonary:      Effort: Pulmonary effort is normal. No respiratory distress.      Breath sounds: Normal breath sounds.   Musculoskeletal:         General: Normal range of motion.      Cervical back: Normal range of motion and neck supple.   Lymphadenopathy:      Cervical: No cervical adenopathy.   Skin:     General: Skin is warm and dry.      Capillary Refill: Capillary refill takes less than 2 seconds.   Neurological:      General: No focal deficit present.      Mental Status: She is alert and oriented to person, place, and time.   Psychiatric:         Mood and Affect: Mood normal.         Behavior: Behavior normal.         Thought Content: Thought content normal.         Judgment: Judgment normal.           Labs: none    Assessment & Plan: Medical Decision Making   31 y.o. female with the following -    Problem List Items Addressed This Visit     Mild episode of recurrent major depressive disorder (HCC)     Chronic condition currently active  -Lexapro 10 mg daily ordered, discussed " ADRs risk and benefits with patient.  Educational handout sent to patient  -Behavioral health referral  -ED precautions given and known for suicidal ideations           Relevant Medications    escitalopram (LEXAPRO) 10 MG Tab    hydrOXYzine HCl (ATARAX) 25 MG Tab    Other Relevant Orders    Patient has been identified as having a positive depression screening. Appropriate orders and counseling have been given. (Completed)    Referral to Behavioral Health    TSH WITH REFLEX TO FT4    VITAMIN D,25 HYDROXY    Anxiety     - Chronic condition currently exacerbated and active  -Lexapro 10 mg daily prescribed  -Hydroxyzine 25 mg 3 times daily recommended for occasional anxiety symptoms.  Recommend only taking this medication at night as it can make you sleepy.  ADRs risk and benefits discussed advised patient to take blood pressure readings while taking this medication.  -ED precautions given and known for exacerbation of this condition.           Relevant Medications    escitalopram (LEXAPRO) 10 MG Tab    hydrOXYzine HCl (ATARAX) 25 MG Tab    Other Relevant Orders    Patient has been identified as having a positive depression screening. Appropriate orders and counseling have been given. (Completed)    Referral to Behavioral Health    TSH WITH REFLEX TO FT4    VITAMIN D,25 HYDROXY    BMI 31.0-31.9,adult     Chronic condition, progressive  -Recommend continue healthy diet and working out consistently  -Labs ordered              Relevant Orders    HEMOGLOBIN A1C    CBC WITHOUT DIFFERENTIAL    Comp Metabolic Panel    Lipid Profile    TSH WITH REFLEX TO FT4    VITAMIN D,25 HYDROXY    PCOS (polycystic ovarian syndrome)    Relevant Orders    HEMOGLOBIN A1C    Comp Metabolic Panel    Lipid Profile          Differential diagnosis, natural history, supportive care, and indications for immediate follow-up discussed.  Shared decision making approach was utilized, and patient is amendable with plan of care.  Patient understands to  return to clinic or go to the emergency department if symptoms worsen. All questions and concerns addressed.      Return in about 4 weeks (around 6/22/2022) for F/U Depression.    Please note that this dictation was created using voice recognition software. I have made every reasonable attempt to correct obvious errors, but I expect that there are errors of grammar and possibly content that I did not discover before finalizing the note.

## 2022-05-25 NOTE — ASSESSMENT & PLAN NOTE
- Chronic condition currently exacerbated and active  -Lexapro 10 mg daily prescribed  -Hydroxyzine 25 mg 3 times daily recommended for occasional anxiety symptoms.  Recommend only taking this medication at night as it can make you sleepy.  ADRs risk and benefits discussed advised patient to take blood pressure readings while taking this medication.  -ED precautions given and known for exacerbation of this condition.

## 2022-05-25 NOTE — ASSESSMENT & PLAN NOTE
Chronic condition, progressive  -Recommend continue healthy diet and working out consistently  -Labs ordered

## 2022-06-03 ENCOUNTER — HOSPITAL ENCOUNTER (OUTPATIENT)
Dept: LAB | Facility: MEDICAL CENTER | Age: 32
End: 2022-06-03
Payer: COMMERCIAL

## 2022-06-03 DIAGNOSIS — F33.0 MILD EPISODE OF RECURRENT MAJOR DEPRESSIVE DISORDER (HCC): ICD-10-CM

## 2022-06-03 DIAGNOSIS — F41.9 ANXIETY: ICD-10-CM

## 2022-06-03 DIAGNOSIS — E28.2 PCOS (POLYCYSTIC OVARIAN SYNDROME): ICD-10-CM

## 2022-06-03 LAB
25(OH)D3 SERPL-MCNC: 55 NG/ML (ref 30–100)
ALBUMIN SERPL BCP-MCNC: 4.8 G/DL (ref 3.2–4.9)
ALBUMIN/GLOB SERPL: 1.5 G/DL
ALP SERPL-CCNC: 71 U/L (ref 30–99)
ALT SERPL-CCNC: 15 U/L (ref 2–50)
ANION GAP SERPL CALC-SCNC: 14 MMOL/L (ref 7–16)
AST SERPL-CCNC: 17 U/L (ref 12–45)
BILIRUB SERPL-MCNC: 0.4 MG/DL (ref 0.1–1.5)
BUN SERPL-MCNC: 13 MG/DL (ref 8–22)
CALCIUM SERPL-MCNC: 9.8 MG/DL (ref 8.5–10.5)
CHLORIDE SERPL-SCNC: 103 MMOL/L (ref 96–112)
CHOLEST SERPL-MCNC: 191 MG/DL (ref 100–199)
CO2 SERPL-SCNC: 22 MMOL/L (ref 20–33)
CREAT SERPL-MCNC: 0.77 MG/DL (ref 0.5–1.4)
ERYTHROCYTE [DISTWIDTH] IN BLOOD BY AUTOMATED COUNT: 42.8 FL (ref 35.9–50)
EST. AVERAGE GLUCOSE BLD GHB EST-MCNC: 114 MG/DL
GFR SERPLBLD CREATININE-BSD FMLA CKD-EPI: 104 ML/MIN/1.73 M 2
GLOBULIN SER CALC-MCNC: 3.2 G/DL (ref 1.9–3.5)
GLUCOSE SERPL-MCNC: 84 MG/DL (ref 65–99)
HBA1C MFR BLD: 5.6 % (ref 4–5.6)
HCT VFR BLD AUTO: 42.3 % (ref 37–47)
HDLC SERPL-MCNC: 31 MG/DL
HGB BLD-MCNC: 13.8 G/DL (ref 12–16)
LDLC SERPL CALC-MCNC: 113 MG/DL
MCH RBC QN AUTO: 28.1 PG (ref 27–33)
MCHC RBC AUTO-ENTMCNC: 32.6 G/DL (ref 33.6–35)
MCV RBC AUTO: 86.2 FL (ref 81.4–97.8)
PLATELET # BLD AUTO: 246 K/UL (ref 164–446)
PMV BLD AUTO: 10.5 FL (ref 9–12.9)
POTASSIUM SERPL-SCNC: 4.1 MMOL/L (ref 3.6–5.5)
PROT SERPL-MCNC: 8 G/DL (ref 6–8.2)
RBC # BLD AUTO: 4.91 M/UL (ref 4.2–5.4)
SODIUM SERPL-SCNC: 139 MMOL/L (ref 135–145)
TRIGL SERPL-MCNC: 236 MG/DL (ref 0–149)
TSH SERPL DL<=0.005 MIU/L-ACNC: 1.95 UIU/ML (ref 0.38–5.33)
WBC # BLD AUTO: 6.7 K/UL (ref 4.8–10.8)

## 2022-06-03 PROCEDURE — 85027 COMPLETE CBC AUTOMATED: CPT

## 2022-06-03 PROCEDURE — 80061 LIPID PANEL: CPT

## 2022-06-03 PROCEDURE — 83036 HEMOGLOBIN GLYCOSYLATED A1C: CPT

## 2022-06-03 PROCEDURE — 80053 COMPREHEN METABOLIC PANEL: CPT

## 2022-06-03 PROCEDURE — 36415 COLL VENOUS BLD VENIPUNCTURE: CPT

## 2022-06-03 PROCEDURE — 82306 VITAMIN D 25 HYDROXY: CPT

## 2022-06-03 PROCEDURE — 84443 ASSAY THYROID STIM HORMONE: CPT

## 2022-06-08 DIAGNOSIS — F41.9 ANXIETY: ICD-10-CM

## 2022-06-08 DIAGNOSIS — F33.0 MILD EPISODE OF RECURRENT MAJOR DEPRESSIVE DISORDER (HCC): ICD-10-CM

## 2022-06-08 RX ORDER — ESCITALOPRAM OXALATE 10 MG/1
10 TABLET ORAL DAILY
Qty: 30 TABLET | Refills: 6 | Status: SHIPPED | OUTPATIENT
Start: 2022-06-08 | End: 2022-06-24 | Stop reason: SDUPTHER

## 2022-06-24 ENCOUNTER — OFFICE VISIT (OUTPATIENT)
Dept: MEDICAL GROUP | Facility: PHYSICIAN GROUP | Age: 32
End: 2022-06-24
Payer: COMMERCIAL

## 2022-06-24 VITALS
HEIGHT: 67 IN | WEIGHT: 199 LBS | SYSTOLIC BLOOD PRESSURE: 122 MMHG | DIASTOLIC BLOOD PRESSURE: 78 MMHG | HEART RATE: 80 BPM | TEMPERATURE: 98.7 F | BODY MASS INDEX: 31.23 KG/M2 | OXYGEN SATURATION: 95 %

## 2022-06-24 DIAGNOSIS — F33.0 MILD EPISODE OF RECURRENT MAJOR DEPRESSIVE DISORDER (HCC): ICD-10-CM

## 2022-06-24 DIAGNOSIS — E78.5 DYSLIPIDEMIA: ICD-10-CM

## 2022-06-24 DIAGNOSIS — F41.9 ANXIETY: ICD-10-CM

## 2022-06-24 PROCEDURE — 99214 OFFICE O/P EST MOD 30 MIN: CPT

## 2022-06-24 RX ORDER — HYDROXYZINE HYDROCHLORIDE 25 MG/1
25 TABLET, FILM COATED ORAL 3 TIMES DAILY PRN
Qty: 30 TABLET | Refills: 1 | Status: SHIPPED | OUTPATIENT
Start: 2022-06-24 | End: 2022-12-05 | Stop reason: SDUPTHER

## 2022-06-24 RX ORDER — ESCITALOPRAM OXALATE 10 MG/1
10 TABLET ORAL DAILY
Qty: 90 TABLET | Refills: 3 | Status: SHIPPED | OUTPATIENT
Start: 2022-06-24 | End: 2022-12-05 | Stop reason: SDUPTHER

## 2022-06-24 ASSESSMENT — ANXIETY QUESTIONNAIRES
GAD7 TOTAL SCORE: 8
5. BEING SO RESTLESS THAT IT IS HARD TO SIT STILL: MORE THAN HALF THE DAYS
6. BECOMING EASILY ANNOYED OR IRRITABLE: SEVERAL DAYS
2. NOT BEING ABLE TO STOP OR CONTROL WORRYING: SEVERAL DAYS
7. FEELING AFRAID AS IF SOMETHING AWFUL MIGHT HAPPEN: SEVERAL DAYS
4. TROUBLE RELAXING: SEVERAL DAYS
3. WORRYING TOO MUCH ABOUT DIFFERENT THINGS: SEVERAL DAYS
1. FEELING NERVOUS, ANXIOUS, OR ON EDGE: SEVERAL DAYS

## 2022-06-24 ASSESSMENT — ENCOUNTER SYMPTOMS
DEPRESSION: 0
NERVOUS/ANXIOUS: 1
INSOMNIA: 1

## 2022-06-24 ASSESSMENT — FIBROSIS 4 INDEX: FIB4 SCORE: 0.59

## 2022-06-24 ASSESSMENT — PATIENT HEALTH QUESTIONNAIRE - PHQ9: CLINICAL INTERPRETATION OF PHQ2 SCORE: 0

## 2022-06-24 NOTE — PROGRESS NOTES
"Subjective:     CC: Follow-up depression/anxiety and lab review    HPI:   Sobeida presents today with    Problem   Dyslipidemia     Latest Reference Range & Units 06/03/22 12:19   Cholesterol,Tot 100 - 199 mg/dL 191   Triglycerides 0 - 149 mg/dL 236 (H)   HDL >=40 mg/dL 31 !   LDL <100 mg/dL 113 (H)   (H): Data is abnormally high  !: Data is abnormal  This is a new condition.  Not currently taking statin. Eats healthy, trying to walk more.  Patient has a strong family history of hyperlipidemia with high triglycerides In mother and father     Mild Episode of Recurrent Major Depressive Disorder (Hcc)    PhQ9 IS 0 today, this is down  From 17  last visit.  She has been taking Lexapro 10 mg daily and this is helping her tremendously.  No side effects reported no suicidal ideations.  We will continue with this medication.       Anxiety    TIM-7 score today is 8.  This is down from last month's reading at 17.  She started taking Lexapro 10 mg daily and is doing tremendously better.  She reports no side effects.  Denies any suicidal ideations.             Health Maintenance: Completed recommend COVID booster at pharmacy of choice    ROS:  Review of Systems   Psychiatric/Behavioral: Negative for depression and suicidal ideas. The patient is nervous/anxious (mild) and has insomnia (occassion).    All other systems reviewed and are negative.      Objective:     Exam:  /78 (BP Location: Left arm, Patient Position: Sitting, BP Cuff Size: Adult)   Pulse 80   Temp 37.1 °C (98.7 °F) (Temporal)   Ht 1.692 m (5' 6.6\")   Wt 90.3 kg (199 lb)   SpO2 95%   BMI 31.54 kg/m²  Body mass index is 31.54 kg/m².    Physical Exam  Constitutional:       General: She is not in acute distress.     Appearance: Normal appearance. She is not ill-appearing or toxic-appearing.   HENT:      Head: Normocephalic.      Right Ear: Tympanic membrane, ear canal and external ear normal.      Left Ear: Tympanic membrane, ear canal and external ear " normal.      Nose: Nose normal.      Mouth/Throat:      Mouth: Mucous membranes are moist.      Pharynx: Oropharynx is clear.   Eyes:      Extraocular Movements: Extraocular movements intact.      Conjunctiva/sclera: Conjunctivae normal.      Pupils: Pupils are equal, round, and reactive to light.   Cardiovascular:      Rate and Rhythm: Normal rate and regular rhythm.      Pulses: Normal pulses.      Heart sounds: Normal heart sounds. No murmur heard.  Pulmonary:      Effort: Pulmonary effort is normal. No respiratory distress.      Breath sounds: Normal breath sounds.   Musculoskeletal:         General: Normal range of motion.      Cervical back: Normal range of motion and neck supple.   Lymphadenopathy:      Cervical: No cervical adenopathy.   Skin:     General: Skin is warm and dry.      Capillary Refill: Capillary refill takes less than 2 seconds.   Neurological:      General: No focal deficit present.      Mental Status: She is alert and oriented to person, place, and time.   Psychiatric:         Mood and Affect: Mood normal.         Behavior: Behavior normal.         Labs: cbc, cmp, vitamin D and TSH all within normal limits.  Elevated lipid panel    Assessment & Plan: Medical Decision Making     31 y.o. female with the following -     Problem List Items Addressed This Visit     Mild episode of recurrent major depressive disorder (HCC)     Chronic, currently in remission taking Lexapro 10 mg mg per day as prescribed without adverse effects.  Denies any symptoms of depression or SI/HI today.  Therefore, we will continue the current regimen.             Anxiety     Chronic condition currently in partial remission  - Continue Lexapro 10 mg daily  - Continue Atarax as needed for sleep 25 mg nightly  - Follow-up with referral provided for behavioral health           Dyslipidemia     -Chronic condition, active  -Recommend lifestyle enhancements of diet/exercise  -We will recheck lipid panel at next lab draw on an  annual basis                 Differential diagnosis, natural history, supportive care, and indications for immediate follow-up discussed.  Shared decision making approach utilized, and patient is amendable with plan of care.  Patient understands to return to clinic or go to the emergency department if symptoms worsen. All questions and concerns addressed.    Return in about 6 months (around 12/24/2022) for Anxiety, Health Maintanence.    Please note that this dictation was created using voice recognition software. I have made every reasonable attempt to correct obvious errors, but I expect that there are errors of grammar and possibly content that I did not discover before finalizing the note.

## 2022-06-24 NOTE — ASSESSMENT & PLAN NOTE
Chronic, currently in remission taking Lexapro 10 mg mg per day as prescribed without adverse effects.  Denies any symptoms of depression or SI/HI today.  Therefore, we will continue the current regimen.

## 2022-06-24 NOTE — ASSESSMENT & PLAN NOTE
-Chronic condition, active  -Recommend lifestyle enhancements of diet/exercise  -We will recheck lipid panel at next lab draw on an annual basis

## 2022-06-24 NOTE — ASSESSMENT & PLAN NOTE
Chronic condition currently in partial remission  - Continue Lexapro 10 mg daily  - Continue Atarax as needed for sleep 25 mg nightly  - Follow-up with referral provided for behavioral health

## 2022-06-30 ENCOUNTER — OFFICE VISIT (OUTPATIENT)
Dept: URGENT CARE | Facility: CLINIC | Age: 32
End: 2022-06-30
Payer: COMMERCIAL

## 2022-06-30 ENCOUNTER — APPOINTMENT (OUTPATIENT)
Dept: RADIOLOGY | Facility: IMAGING CENTER | Age: 32
End: 2022-06-30
Payer: COMMERCIAL

## 2022-06-30 VITALS
SYSTOLIC BLOOD PRESSURE: 118 MMHG | RESPIRATION RATE: 16 BRPM | TEMPERATURE: 97.8 F | BODY MASS INDEX: 31.23 KG/M2 | OXYGEN SATURATION: 98 % | WEIGHT: 199 LBS | HEIGHT: 67 IN | DIASTOLIC BLOOD PRESSURE: 80 MMHG | HEART RATE: 76 BPM

## 2022-06-30 DIAGNOSIS — M79.605 PAIN OF LEFT LOWER EXTREMITY: ICD-10-CM

## 2022-06-30 PROCEDURE — 73590 X-RAY EXAM OF LOWER LEG: CPT | Mod: TC,LT | Performed by: NURSE PRACTITIONER

## 2022-06-30 PROCEDURE — 99213 OFFICE O/P EST LOW 20 MIN: CPT

## 2022-06-30 ASSESSMENT — ENCOUNTER SYMPTOMS
WEAKNESS: 0
TINGLING: 0

## 2022-06-30 ASSESSMENT — FIBROSIS 4 INDEX: FIB4 SCORE: 0.61

## 2022-06-30 NOTE — PROGRESS NOTES
Sobeida Valle is a 32 y.o. female who presents for Leg Injury (X 2 days, Lt. Lower leg injury)      HPI   This is a 32-year-old female who presents today for left lower extremity pain.  She reports pain 1 out of 10 and describes it as throbbing, but is worsened by ambulation.  She reports that she was stepping onto a boat and slipped, resulting in falling onto the edge of boat 2 days ago.  She has been able to bear weight, but has been painful.  She has tried icing, Tylenol with little relief.      Review of Systems   Musculoskeletal: Negative for joint pain.   Neurological: Negative for tingling and weakness.   All other systems reviewed and are negative.      Allergies:       Allergies   Allergen Reactions   • Pseudoephedrine Hcl Unspecified     BP spikes   • Sudafed Unspecified     Elevated blood pressure       PMSFS Hx:  Past Medical History:   Diagnosis Date   • Asthma     exercise induced   • Pain     Right knee   • PCOS (polycystic ovarian syndrome)      Past Surgical History:   Procedure Laterality Date   • PB KNEE SCOPE,AID ANT CRUCIATE REPAIR Right 2019    Procedure: RECONSTRUCTION, KNEE, ACL, ARTHROSCOPIC- WITH ALLOGRAFT;  Surgeon: Mika Beach M.D.;  Location: SURGERY [facility] [place];  Service: Orthopedics   • OTHER Bilateral 2018    Lasik procedure   • DENTAL EXTRACTION(S)  2003    wisdom teeth     Family History   Problem Relation Age of Onset   • No Known Problems Mother    • Diabetes Father    • COPD Sister    • Diabetes Paternal Grandmother    • No Known Problems Son      Social History     Tobacco Use   • Smoking status: Former Smoker     Packs/day: 0.10     Years: 3.00     Pack years: 0.30     Types: Cigarettes     Quit date:      Years since quittin.4   • Smokeless tobacco: Never Used   Substance Use Topics   • Alcohol use: Yes     Comment: one drink per month       Problems:   Patient Active Problem List   Diagnosis   • Mild episode of recurrent major depressive disorder  "([facility])   • Anxiety   • BMI 31.0-31.9,adult   • PCOS (polycystic ovarian syndrome)   • Dyslipidemia       Medications:   Current Outpatient Medications on File Prior to Visit   Medication Sig Dispense Refill   • hydrOXYzine HCl (ATARAX) 25 MG Tab Take 1 Tablet by mouth 3 times a day as needed for Anxiety. 30 Tablet 1   • escitalopram (LEXAPRO) 10 MG Tab Take 1 Tablet by mouth every day. 90 Tablet 3   • Cholecalciferol (VITAMIN D3 PO) Take 1 Capsule by mouth every day.     • Omega-3 Fatty Acids (FISH OIL PO) Take 1 Capsule by mouth every day.     • acetaminophen (TYLENOL) 325 MG Tab Take 650 mg by mouth every four hours as needed.     • albuterol 108 (90 Base) MCG/ACT Aero Soln inhalation aerosol Inhale 2 Puffs by mouth every 6 hours as needed for Shortness of Breath. 8.5 g 0     No current facility-administered medications on file prior to visit.          Objective:     /80 (BP Location: Left arm, Patient Position: Sitting, BP Cuff Size: Large adult)   Pulse 76   Temp 36.6 °C (97.8 °F) (Temporal)   Resp 16   Ht 1.692 m (5' 6.6\")   Wt 90.3 kg (199 lb)   SpO2 98%   BMI 31.54 kg/m²     Physical Exam  Vitals and nursing note reviewed.   Constitutional:       General: She is not in acute distress.     Appearance: Normal appearance. She is normal weight. She is not ill-appearing or toxic-appearing.   HENT:      Head: Normocephalic and atraumatic.   Cardiovascular:      Rate and Rhythm: Normal rate and regular rhythm.      Pulses: Normal pulses.      Heart sounds: Normal heart sounds.   Pulmonary:      Effort: Pulmonary effort is normal.      Breath sounds: Normal breath sounds.   Musculoskeletal:         General: Swelling and tenderness present.      Left lower leg: Tenderness present. No deformity or lacerations. 1+ Edema present.        Legs:    Skin:     General: Skin is warm and dry.      Capillary Refill: Capillary refill takes less than 2 seconds.      Findings: Bruising present.   Neurological:      " General: No focal deficit present.      Mental Status: She is alert and oriented to person, place, and time. Mental status is at baseline.   Psychiatric:         Mood and Affect: Mood normal.         Behavior: Behavior normal.         Thought Content: Thought content normal.         Judgment: Judgment normal.           Assessment /Associated Orders:      1. Pain of left lower extremity  DX-TIBIA AND FIBULA LEFT        6/30/2022 9:19 AM     HISTORY/REASON FOR EXAM:  Pain/Deformity Following Trauma.     TECHNIQUE/EXAM DESCRIPTION AND NUMBER OF VIEWS:  2 views of the LEFT tibia and fibula.     COMPARISON: None     FINDINGS:  Bones: Normal bone mineralization. No fracture. No focal bone lesion.     Joints: Intact. No subluxation or joint effusion.     Soft tissues: Unremarkable.     IMPRESSION:     Normal left lower leg radiography.    Medical Decision Making:    Pt is clinically stable at today's acute urgent care visit.  No acute distress noted. Appropriate for outpatient management at this time.   Acute problem today with uncertain prognosis.     Tib-fib x-ray unremarkable for any bony injury.  Patient advised to rest, ice, elevate, and take over-the-counter pain relievers.  Patient agreeable with plan of care.  Advised patient to return for any new or worsening signs or symptoms.    • Discussed DDx, management options (risks,benefits, and alternatives to planned treatment), natural progression and supportive care.  Expressed understanding and the treatment plan was agreed upon. Questions were encouraged and answered   • Return to urgent care prn if new or worsening sx or if there is no improvement in condition prn.    • Educated in Red flags and indications to immediately call 911 or present to the Emergency Department.     I personally reviewed prior external notes and test results pertinent to today's visit.  I have independently reviewed and interpreted all diagnostics ordered during this urgent care acute visit.

## 2022-12-05 DIAGNOSIS — F33.0 MILD EPISODE OF RECURRENT MAJOR DEPRESSIVE DISORDER (HCC): ICD-10-CM

## 2022-12-05 DIAGNOSIS — F41.9 ANXIETY: ICD-10-CM

## 2022-12-05 RX ORDER — ESCITALOPRAM OXALATE 10 MG/1
10 TABLET ORAL DAILY
Qty: 90 TABLET | Refills: 3 | Status: SHIPPED | OUTPATIENT
Start: 2022-12-05 | End: 2024-01-02 | Stop reason: SDUPTHER

## 2022-12-05 RX ORDER — HYDROXYZINE HYDROCHLORIDE 25 MG/1
25 TABLET, FILM COATED ORAL 3 TIMES DAILY PRN
Qty: 90 TABLET | Refills: 0 | Status: SHIPPED | OUTPATIENT
Start: 2022-12-05 | End: 2023-10-09

## 2022-12-05 NOTE — TELEPHONE ENCOUNTER
Received request via: Pharmacy    Was the patient seen in the last year in this department? Yes    Does the patient have an active prescription (recently filled or refills available) for medication(s) requested?  NEEDS MED SENT TO MAIL ORDER PHARMACY PLEASE ADVISE.    Does the patient have [facility] and need 100 day supply (blood pressure, diabetes and cholesterol meds only)? Patient does not have SCP

## 2022-12-05 NOTE — TELEPHONE ENCOUNTER
Received request via: Pharmacy    Was the patient seen in the last year in this department? Yes    Does the patient have an active prescription (recently filled or refills available) for medication(s) requested?  NEEDS MED SENT TO MAIL ORDER PHARMACY PLEASE ADVISE.    Does the patient have [facility] and need 100 day supply (blood pressure, diabetes and cholesterol meds only)? Patient does not have SCP

## 2022-12-19 ENCOUNTER — OFFICE VISIT (OUTPATIENT)
Dept: BEHAVIORAL HEALTH | Facility: CLINIC | Age: 32
End: 2022-12-19
Payer: COMMERCIAL

## 2022-12-19 DIAGNOSIS — F41.9 ANXIETY: ICD-10-CM

## 2022-12-19 DIAGNOSIS — F33.1 MODERATE EPISODE OF RECURRENT MAJOR DEPRESSIVE DISORDER (HCC): ICD-10-CM

## 2022-12-19 PROCEDURE — 90791 PSYCH DIAGNOSTIC EVALUATION: CPT | Performed by: SOCIAL WORKER

## 2022-12-19 ASSESSMENT — ANXIETY QUESTIONNAIRES
2. NOT BEING ABLE TO STOP OR CONTROL WORRYING: MORE THAN HALF THE DAYS
6. BECOMING EASILY ANNOYED OR IRRITABLE: SEVERAL DAYS
IF YOU CHECKED OFF ANY PROBLEMS ON THIS QUESTIONNAIRE, HOW DIFFICULT HAVE THESE PROBLEMS MADE IT FOR YOU TO DO YOUR WORK, TAKE CARE OF THINGS AT HOME, OR GET ALONG WITH OTHER PEOPLE: SOMEWHAT DIFFICULT
4. TROUBLE RELAXING: MORE THAN HALF THE DAYS
3. WORRYING TOO MUCH ABOUT DIFFERENT THINGS: NEARLY EVERY DAY
1. FEELING NERVOUS, ANXIOUS, OR ON EDGE: NEARLY EVERY DAY
GAD7 TOTAL SCORE: 13
7. FEELING AFRAID AS IF SOMETHING AWFUL MIGHT HAPPEN: SEVERAL DAYS
5. BEING SO RESTLESS THAT IT IS HARD TO SIT STILL: SEVERAL DAYS

## 2022-12-19 ASSESSMENT — PATIENT HEALTH QUESTIONNAIRE - PHQ9
CLINICAL INTERPRETATION OF PHQ2 SCORE: 6
5. POOR APPETITE OR OVEREATING: 0 - NOT AT ALL
SUM OF ALL RESPONSES TO PHQ QUESTIONS 1-9: 14
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
## 2022-12-29 ENCOUNTER — OFFICE VISIT (OUTPATIENT)
Dept: BEHAVIORAL HEALTH | Facility: CLINIC | Age: 32
End: 2022-12-29
Payer: COMMERCIAL

## 2022-12-29 DIAGNOSIS — F33.1 MODERATE EPISODE OF RECURRENT MAJOR DEPRESSIVE DISORDER (HCC): ICD-10-CM

## 2022-12-29 PROCEDURE — 90837 PSYTX W PT 60 MINUTES: CPT | Performed by: SOCIAL WORKER

## 2022-12-29 NOTE — PROGRESS NOTES
"[facility]   Therapy Progress Note        Name: Sobeida Valle  MRN: [id number]  : 1990  Age: 32 y.o.  Date of assessment: 2022  PCP: Messi Kaufman D.N.P.  Persons in attendance: Patient  Total session time: 54 minutes      Topics addressed in psychotherapy include: \"[facility], he does have PTSD and they are getting him into therapy. WE are also going to attend a [facility]'s [facility]. They also say he has Bipolar.  I am still pretty anxious over my step-son and how difficult for him at his mom's. She continues to blame me that her  left her but I did not meet him until after he had already filed  papers. I was not even in the state. But now I have been in his son's life since he was [age]. I am concerned about his 2 year old sister and have told home to talk to his step dad who is the father, or his therapist or the school counselors. Encouraged she can also make a report without giving her name because the focus should be on the child not her mother's reactions.    Continue to work on boundaries what is hers versus what belongs to the other person.    Objective Observations:   Participation:Active verbal participation, Attentive, and Open to feedback   Grooming:Casual   Cognition:Alert and Fully Oriented   Eye Contact:Good   Mood:Anxious   Affect:Anxious   Thought Process:Goal-directed   Speech:Rate within normal limits and Volume within normal limits    Current Risk:   Suicide: not indicated   Homicide: not indicated   Self-Harm: not indicated   Relapse: not indicated   Safety Plan Reviewed: not applicable    Care Plan Updated:  next session ran out of time    Does patient express agreement with the above plan? Yes     Diagnosis:  No diagnosis found.    Referral appointment(s) scheduled?  Made prior        Garry Khan L.C.S.W.   "

## 2023-01-19 ENCOUNTER — OFFICE VISIT (OUTPATIENT)
Dept: BEHAVIORAL HEALTH | Facility: CLINIC | Age: 33
End: 2023-01-19
Payer: COMMERCIAL

## 2023-01-19 DIAGNOSIS — F33.1 MODERATE EPISODE OF RECURRENT MAJOR DEPRESSIVE DISORDER (HCC): ICD-10-CM

## 2023-01-19 PROCEDURE — 90834 PSYTX W PT 45 MINUTES: CPT | Mod: 95 | Performed by: SOCIAL WORKER

## 2023-01-19 NOTE — PROGRESS NOTES
"[facility]   Therapy Progress Note      This visit was conducted via Zoom using secure and encrypted videoconferencing technology.  The patient was in a private location in the state of [place].  The patient's identity was confirmed and verbal consent was obtained for this virtual visit.  Place of Service: POS 10 - 2 The patient is at  mother's Home  9 in [place]) during their visit           Name: Sobeida Vlale  MRN: [id number]  : 1990  Age: 32 y.o.  Date of assessment: 2023  PCP: Messi Kaufman D.N.P.  Persons in attendance: Patient  Total session time: 50 minutes    Objective Observations:   Participation:Active verbal participation and Open to feedback   Grooming:Neat   Cognition:Alert and Fully Oriented   Eye Contact:Good   Mood:Depressed and Anxious   Affect:Congruent with content   Thought Process:Goal-directed   Speech:Rate within normal limits and Volume within normal limits    Current Risk:   Suicide: not indicated   Homicide: not indicated   Self-Harm: not indicated   Relapse: not indicated   Safety Plan Reviewed: not applicable    Topics addressed in psychotherapy include: \" Dad had a massive heart attack. My sister was with him but she is struggling with dad/ I am taking care of notification of everyone.  I am trying to not feel conflicted, I had called him on Monday, he was talking about if he was a good dad and saying he was ready , crying but then he  was laughing hysterically.    Depression screening was due but under circumstance will hold as it will not be clear indication of mood. Encouraged to stay focused on own ADL's giving breaks from the hospital. Take this in stages Encourage to use her mother and  for support and feedback on decisions.    Care Plan Updated: No    Does patient express agreement with the above plan? Yes     Diagnosis:  1. Moderate episode of recurrent major depressive disorder (HCC)        Referral appointment(s) scheduled?  Made earlier  "       Garry Khan L.C.S.W.

## 2023-02-01 ENCOUNTER — OFFICE VISIT (OUTPATIENT)
Dept: BEHAVIORAL HEALTH | Facility: CLINIC | Age: 33
End: 2023-02-01
Payer: COMMERCIAL

## 2023-02-01 DIAGNOSIS — F33.1 MODERATE EPISODE OF RECURRENT MAJOR DEPRESSIVE DISORDER (HCC): ICD-10-CM

## 2023-02-01 DIAGNOSIS — F41.9 ANXIETY: ICD-10-CM

## 2023-02-01 PROCEDURE — 90837 PSYTX W PT 60 MINUTES: CPT | Mod: 95 | Performed by: SOCIAL WORKER

## 2023-02-01 ASSESSMENT — PATIENT HEALTH QUESTIONNAIRE - PHQ9
SUM OF ALL RESPONSES TO PHQ QUESTIONS 1-9: 10
5. POOR APPETITE OR OVEREATING: 1 - SEVERAL DAYS
CLINICAL INTERPRETATION OF PHQ2 SCORE: 2

## 2023-02-02 NOTE — PROGRESS NOTES
"[facility]   Therapy Progress Note      This visit was conducted via Zoom using secure and encrypted videoconferencing technology.  The patient was in a private location in the [facility].  The patient's identity was confirmed and verbal consent was obtained for this virtual visit.  Place of Service: POS 10 - 2 The patient is at  mother 's Home in [place] during their visit            Name: Sobeida Valle  MRN: [id number]  : 1990  Age: 32 y.o.  Date of assessment: 2023  PCP: Messi Kaufman D.N.P.  Persons in attendance: Patient  Total session time: 56 minutes    Objective Observations:   Participation:Active verbal participation and Engaged   Grooming:Casual and Neat   Cognition:Alert and Fully Oriented   Eye Contact:Good   Mood:Depressed   Affect:Congruent with content   Thought Process:Goal-directed   Speech:Rate within normal limits and Volume within normal limits    Current Risk:   Suicide: not indicated   Homicide: not indicated   Self-Harm: not indicated   Relapse: not indicated   Safety Plan Reviewed: not applicable    Topics addressed in psychotherapy include: \" Dad is still in hospital and he is off life support. He is still very fragile but he is off life support. He had a massive heart attack, he was out 9 minutes and may have brain damage. Sister is doing better with the process. Last week she was in a manic state.  PHQ-9 10 score may be impacted by what is going on with her dad.    I went home for the [place]'s retreat. He was happy that I came and we went on a date even. My son is with me this week. Will travel home next week and my sister will be here.    Care Plan Updated: Yes    Does patient express agreement with the above plan? Yes     Diagnosis:  1. Moderate episode of recurrent major depressive disorder (HCC)        Referral appointment(s) scheduled?  Made prior        Garry Khan L.C.S.W.   "

## 2023-03-06 ENCOUNTER — OFFICE VISIT (OUTPATIENT)
Dept: BEHAVIORAL HEALTH | Facility: CLINIC | Age: 33
End: 2023-03-06
Payer: COMMERCIAL

## 2023-03-06 DIAGNOSIS — F33.1 MODERATE EPISODE OF RECURRENT MAJOR DEPRESSIVE DISORDER (HCC): ICD-10-CM

## 2023-03-06 PROCEDURE — 90837 PSYTX W PT 60 MINUTES: CPT | Mod: 95 | Performed by: SOCIAL WORKER

## 2023-03-06 NOTE — PROGRESS NOTES
"[facility]   Therapy Progress Note      This visit was conducted via Zoom using secure and encrypted videoconferencing technology.  The patient was in a private location in the [facility].  The patient's identity was confirmed and verbal consent was obtained for this virtual visit.  Place of Service:       POS 02 - Virtual visits from a location other than the patient's Home in her car    Name: Sobeida Valle  MRN: [id number]  : 1990  Age: 32 y.o.  Date of assessment: 3/6/2023  PCP: Messi Kaufman D.N.P.  Persons in attendance: Patient  Total session time: 55 minutes    Objective Observations:   Participation:Active verbal participation   Grooming:Casual and Neat   Cognition:Alert and Fully Oriented   Eye Contact:Good   Mood:Anxious   Affect:Congruent with content   Thought Process:Logical and Goal-directed   Speech:Rate within normal limits and Volume within normal limits    Current Risk:   Suicide: not indicated   Homicide: not indicated   Self-Harm: not indicated   Relapse: not indicated   Safety Plan Reviewed: not applicable    Topics addressed in psychotherapy include:   \" Dad is not out of the hospital and trying to move him out of [place].I have a guilt over putting he into an independent living situation. He is trying to process saying good bye to his friend. He cannot return to [place]. Right now he cannot return due to the extensive damage to his heart. He is trying to grasp the loss of his independence.  I do not have the space for him to live with me.    Care Plan Updated: No changes    Does patient express agreement with the above plan? Yes     Diagnosis:  1. Moderate episode of recurrent major depressive disorder (HCC)        Referral appointment(s) scheduled?  Made earlier        Garry Khan L.C.S.W.   "

## 2023-04-07 ENCOUNTER — OFFICE VISIT (OUTPATIENT)
Dept: BEHAVIORAL HEALTH | Facility: CLINIC | Age: 33
End: 2023-04-07
Payer: COMMERCIAL

## 2023-04-07 DIAGNOSIS — F33.1 MODERATE EPISODE OF RECURRENT MAJOR DEPRESSIVE DISORDER (HCC): ICD-10-CM

## 2023-04-07 PROCEDURE — 90837 PSYTX W PT 60 MINUTES: CPT | Mod: 95 | Performed by: SOCIAL WORKER

## 2023-04-07 NOTE — PROGRESS NOTES
"[facility]   Psychotherapy Progress Note      This visit was conducted via Zoom using secure and encrypted videoconferencing technology.  The patient was in a private location in the [facility].  The patient's identity was confirmed and verbal consent was obtained for this virtual visit.  Place of Service: POS 10 -The patient is at Home during their visit           Name: Sobeida Valle  MRN: [id number]  : 1990  Age: 32 y.o.  Date of assessment: 2023  PCP: Messi Kaufman D.N.P.  Persons in attendance: Patient  Total session time: 60 minutes    Objective Observations:   Participation:Active verbal participation, Engaged, and Open to feedback   Grooming:Casual and Neat   Cognition:Alert   Eye Contact:Good   Mood:Anxious and Irritable   Affect:Congruent with content   Thought Process:Logical and Goal-directed   Speech:Rate within normal limits and Volume within normal limits    Current Risk:   Suicide: not indicated   Homicide: not indicated   Self-Harm: not indicated   Relapse: not indicated   Safety Plan Reviewed: not applicable    Topics addressed in psychotherapy include: \" I am struggling with my dad, he was doing well, he was progressing and not using his walker. But now he thinks he can go back to [place]. [place] could [place].  He admitted thst he was having trouble with his memory but now he says he will not talk about it.    There is a push/pull that is causing conflict within herself and how to move forward. Encouraged to have this discussion about safety and ability to care for himself with the doctor on Tuesday. He appears to be struggling with the fact that he does not have the freedoms he once had and may never again, so he reacts as he always had with destained and anger toward her.    Care Plan Updated:  reviewed, boundaries reviewed    Does patient express agreement with the above plan? Yes     Diagnosis:  1. Moderate episode of recurrent major " depressive disorder (HCC)        Referral appointment(s) scheduled?  Made earlier        Garry Khan L.C.S.W.

## 2023-06-02 ENCOUNTER — OFFICE VISIT (OUTPATIENT)
Dept: MEDICAL GROUP | Facility: PHYSICIAN GROUP | Age: 33
End: 2023-06-02
Payer: COMMERCIAL

## 2023-06-02 VITALS
DIASTOLIC BLOOD PRESSURE: 60 MMHG | BODY MASS INDEX: 34.3 KG/M2 | SYSTOLIC BLOOD PRESSURE: 120 MMHG | WEIGHT: 213.4 LBS | HEIGHT: 66 IN | TEMPERATURE: 97.9 F | HEART RATE: 94 BPM | OXYGEN SATURATION: 97 %

## 2023-06-02 DIAGNOSIS — E28.2 PCOS (POLYCYSTIC OVARIAN SYNDROME): ICD-10-CM

## 2023-06-02 DIAGNOSIS — E78.5 DYSLIPIDEMIA: ICD-10-CM

## 2023-06-02 DIAGNOSIS — Z82.49 FAMILY HISTORY OF EARLY CAD: ICD-10-CM

## 2023-06-02 DIAGNOSIS — Z12.83 SCREENING FOR SKIN CANCER: ICD-10-CM

## 2023-06-02 DIAGNOSIS — Z00.00 WELLNESS EXAMINATION: ICD-10-CM

## 2023-06-02 PROCEDURE — 3074F SYST BP LT 130 MM HG: CPT

## 2023-06-02 PROCEDURE — 3078F DIAST BP <80 MM HG: CPT

## 2023-06-02 PROCEDURE — 99395 PREV VISIT EST AGE 18-39: CPT

## 2023-06-02 ASSESSMENT — FIBROSIS 4 INDEX: FIB4 SCORE: 0.61

## 2023-06-02 NOTE — PROGRESS NOTES
Subjective:     CC:   Chief Complaint   Patient presents with    Annual Exam     PE       HPI:   Sobeida Valle is a 32 y.o. female who presents for annual exam    Patient has GYN provider: Yes- Dr. Mccrary  Last Pap Smear: -Normal   H/O Abnormal Pap: Yes, > 7 years ago, normal since  Last Mammogram: N/A  Last Bone Density Test: N/A  Last Colorectal Cancer Screening: N/A  Last Tdap: UTD-  Received HPV series: No    Exercise: walking on lunch breaks about 30 minutes per day, more on weekends. Hoping to return to gym.   Diet: Reports diet has been healthy      No LMP recorded.  Hx STDs: No  Birth control: no  Menses every month with 7 days with moderate bleeding.  Reports mild cramping and does not take OTC analgesics for cramps.  No significant bloating/fluid retention, pelvic pain, or dyspareunia. No abnormal vaginal discharge.  No breast tenderness, mass, nipple discharge, changes in size or contour, or abnormal cyclic discomfort.    OB History    Para Term  AB Living   1 1 1 0 0 0   SAB IAB Ectopic Molar Multiple Live Births   0 0 0 0 0 0      She  reports being sexually active and has had partner(s) who are male.    She  has a past medical history of Anxiety, Asthma, Depression, Pain, and PCOS (polycystic ovarian syndrome).  She  has a past surgical history that includes dental extraction(s) (); other (Bilateral, 2018); and pr knee scope,aid ant cruciate repair (Right, 2019).    Family History   Problem Relation Age of Onset    No Known Problems Mother     Hyperlipidemia Father     Hypertension Father     Heart Disease Father         MI, aortic valve stenosis with replacement    Diabetes Father     COPD Sister     Heart Disease Paternal Aunt         valve replacement    Diabetes Paternal Grandmother     No Known Problems Son      Social History     Tobacco Use    Smoking status: Former     Packs/day: 0.10     Years: 3.00     Pack years: 0.30     Types: Cigarettes     Quit  date:      Years since quittin.4    Smokeless tobacco: Never   Vaping Use    Vaping Use: Never used   Substance Use Topics    Alcohol use: Yes     Comment: one drink per month    Drug use: Never       Patient Active Problem List    Diagnosis Date Noted    Dyslipidemia 2022    Moderate episode of recurrent major depressive disorder (HCC) 2022    Anxiety 2022    BMI 31.0-31.9,adult 2022    PCOS (polycystic ovarian syndrome) 2022     Current Outpatient Medications   Medication Sig Dispense Refill    hydrOXYzine HCl (ATARAX) 25 MG Tab Take 1 Tablet by mouth 3 times a day as needed for Anxiety. 90 Tablet 0    escitalopram (LEXAPRO) 10 MG Tab Take 1 Tablet by mouth every day. 90 Tablet 3    Cholecalciferol (VITAMIN D3 PO) Take 1 Capsule by mouth every day.      Omega-3 Fatty Acids (FISH OIL PO) Take 1 Capsule by mouth every day.      acetaminophen (TYLENOL) 325 MG Tab Take 650 mg by mouth every four hours as needed.      albuterol 108 (90 Base) MCG/ACT Aero Soln inhalation aerosol Inhale 2 Puffs by mouth every 6 hours as needed for Shortness of Breath. 8.5 g 0     No current facility-administered medications for this visit.     Allergies   Allergen Reactions    Pseudoephedrine Hcl Unspecified     BP spikes    Sudafed Unspecified     Elevated blood pressure     Review of Systems   Constitutional: Negative for fever, chills and malaise/fatigue.   HENT: Negative for congestion.    Eyes: Negative for pain.   Respiratory: Negative for cough and shortness of breath.    Cardiovascular: Negative for chest pain and leg swelling.   Gastrointestinal: Negative for nausea, vomiting, abdominal pain and diarrhea.   Genitourinary: Negative for dysuria and hematuria.   Skin: Negative for rash.   Neurological: Negative for dizziness, focal weakness and headaches.   Endo/Heme/Allergies: Does not bruise/bleed easily.   Psychiatric/Behavioral: Negative for depression.  The patient is not nervous/anxious.   "    Objective:   /60 (BP Location: Left arm, Patient Position: Sitting, BP Cuff Size: Adult)   Pulse 94   Temp 36.6 °C (97.9 °F) (Temporal)   Ht 1.676 m (5' 6\")   Wt 96.8 kg (213 lb 6.4 oz)   SpO2 97%   BMI 34.44 kg/m²     Wt Readings from Last 4 Encounters:   06/02/23 96.8 kg (213 lb 6.4 oz)   06/30/22 90.3 kg (199 lb)   06/24/22 90.3 kg (199 lb)   05/25/22 91.5 kg (201 lb 12.8 oz)     Physical Exam:  Constitutional: Well-developed and well-nourished. Not diaphoretic. No distress.   Skin: Skin is warm and dry. No rash noted.  Head: Atraumatic without lesions.  Eyes: Conjunctivae and extraocular motions are normal. Pupils are equal, round, and reactive to light. No scleral icterus.   Ears:  External ears unremarkable. Tympanic membranes clear and intact.  Nose: Nares patent. Septum midline. Turbinates without erythema nor edema. No discharge.   Mouth/Throat: Tongue normal. Oropharynx is clear and moist. Posterior pharynx without erythema or exudates.  Neck: Supple, trachea midline. Normal range of motion. No thyromegaly present. No lymphadenopathy--cervical or supraclavicular.  Cardiovascular: Regular rate and rhythm, S1 and S2 without murmur, rubs, or gallops.    Respiratory: Effort normal. Clear to auscultation throughout. No adventitious sounds.   Abdomen: Soft, non tender, and without distention. Active bowel sounds in all four quadrants. No rebound, guarding, masses or HSM.  Extremities: No cyanosis, clubbing, erythema, nor edema. Distal pulses intact and symmetric.   Musculoskeletal: All major joints AROM full in all directions without pain.  Neurological: Alert and oriented x 3. Grossly non-focal. Strength and sensation grossly intact. DTRs 2+/3 and symmetric.   Psychiatric:  Behavior, mood, and affect are appropriate.    Assessment and Plan:     1. Wellness examination    2. Dyslipidemia  - Comp Metabolic Panel; Future  - Lipid Profile; Future  - Lipoprotein (a); Future    3. Family history of " early CAD  - Lipid Profile; Future  - Lipoprotein (a); Future    4. PCOS (polycystic ovarian syndrome)  - HEMOGLOBIN A1C; Future    5. Screening for skin cancer  - Referral to Dermatology      Health maintenance:  UTD   Labs per orders-see above  Immunizations per orders  Patient counseled about skin care, diet, supplements, and exercise.  Discussed  breast self exam, mammography screening, feminine hygiene, menopause, adequate intake of calcium and vitamin D, diet and exercise, Kegel exercises, colorectal cancer screening     Follow-up: Return in about 6 months (around 12/2/2023) for F/U Lab Review.

## 2023-08-17 ENCOUNTER — APPOINTMENT (RX ONLY)
Dept: URBAN - METROPOLITAN AREA CLINIC 6 | Facility: CLINIC | Age: 33
Setting detail: DERMATOLOGY
End: 2023-08-17

## 2023-08-17 DIAGNOSIS — D18.0 HEMANGIOMA: ICD-10-CM

## 2023-08-17 DIAGNOSIS — D22 MELANOCYTIC NEVI: ICD-10-CM

## 2023-08-17 DIAGNOSIS — Z71.89 OTHER SPECIFIED COUNSELING: ICD-10-CM

## 2023-08-17 DIAGNOSIS — L91.8 OTHER HYPERTROPHIC DISORDERS OF THE SKIN: ICD-10-CM

## 2023-08-17 DIAGNOSIS — L83 ACANTHOSIS NIGRICANS: ICD-10-CM

## 2023-08-17 DIAGNOSIS — L82.1 OTHER SEBORRHEIC KERATOSIS: ICD-10-CM

## 2023-08-17 DIAGNOSIS — L81.4 OTHER MELANIN HYPERPIGMENTATION: ICD-10-CM

## 2023-08-17 PROBLEM — D48.5 NEOPLASM OF UNCERTAIN BEHAVIOR OF SKIN: Status: ACTIVE | Noted: 2023-08-17

## 2023-08-17 PROBLEM — D22.61 MELANOCYTIC NEVI OF RIGHT UPPER LIMB, INCLUDING SHOULDER: Status: ACTIVE | Noted: 2023-08-17

## 2023-08-17 PROBLEM — D22.71 MELANOCYTIC NEVI OF RIGHT LOWER LIMB, INCLUDING HIP: Status: ACTIVE | Noted: 2023-08-17

## 2023-08-17 PROBLEM — D22.72 MELANOCYTIC NEVI OF LEFT LOWER LIMB, INCLUDING HIP: Status: ACTIVE | Noted: 2023-08-17

## 2023-08-17 PROBLEM — D22.5 MELANOCYTIC NEVI OF TRUNK: Status: ACTIVE | Noted: 2023-08-17

## 2023-08-17 PROBLEM — D18.01 HEMANGIOMA OF SKIN AND SUBCUTANEOUS TISSUE: Status: ACTIVE | Noted: 2023-08-17

## 2023-08-17 PROBLEM — D22.62 MELANOCYTIC NEVI OF LEFT UPPER LIMB, INCLUDING SHOULDER: Status: ACTIVE | Noted: 2023-08-17

## 2023-08-17 PROCEDURE — 99203 OFFICE O/P NEW LOW 30 MIN: CPT | Mod: 25

## 2023-08-17 PROCEDURE — ? ADDITIONAL NOTES

## 2023-08-17 PROCEDURE — ? BIOPSY BY SHAVE METHOD

## 2023-08-17 PROCEDURE — ? COUNSELING

## 2023-08-17 PROCEDURE — 11103 TANGNTL BX SKIN EA SEP/ADDL: CPT | Mod: 59

## 2023-08-17 PROCEDURE — 17110 DESTRUCTION B9 LES UP TO 14: CPT

## 2023-08-17 PROCEDURE — ? LIQUID NITROGEN

## 2023-08-17 PROCEDURE — 11102 TANGNTL BX SKIN SINGLE LES: CPT | Mod: 59

## 2023-08-17 ASSESSMENT — LOCATION SIMPLE DESCRIPTION DERM
LOCATION SIMPLE: POSTERIOR NECK
LOCATION SIMPLE: RIGHT PRETIBIAL REGION
LOCATION SIMPLE: LEFT KNEE
LOCATION SIMPLE: LEFT THIGH
LOCATION SIMPLE: LEFT FOREARM
LOCATION SIMPLE: TRAPEZIAL NECK
LOCATION SIMPLE: ABDOMEN
LOCATION SIMPLE: RIGHT KNEE
LOCATION SIMPLE: LEFT POSTERIOR THIGH
LOCATION SIMPLE: RIGHT THIGH
LOCATION SIMPLE: LEFT UPPER ARM
LOCATION SIMPLE: RIGHT UPPER ARM
LOCATION SIMPLE: LEFT PRETIBIAL REGION
LOCATION SIMPLE: RIGHT FOREARM
LOCATION SIMPLE: CHEST

## 2023-08-17 ASSESSMENT — LOCATION DETAILED DESCRIPTION DERM
LOCATION DETAILED: RIGHT ANTECUBITAL SKIN
LOCATION DETAILED: RIGHT KNEE
LOCATION DETAILED: LEFT VENTRAL PROXIMAL FOREARM
LOCATION DETAILED: PERIUMBILICAL SKIN
LOCATION DETAILED: RIGHT ANTERIOR DISTAL UPPER ARM
LOCATION DETAILED: LOWER STERNUM
LOCATION DETAILED: LEFT ANTERIOR DISTAL UPPER ARM
LOCATION DETAILED: LEFT ANTERIOR DISTAL THIGH
LOCATION DETAILED: RIGHT VENTRAL PROXIMAL FOREARM
LOCATION DETAILED: LEFT ANTERIOR PROXIMAL UPPER ARM
LOCATION DETAILED: RIGHT ANTERIOR PROXIMAL UPPER ARM
LOCATION DETAILED: LEFT PROXIMAL PRETIBIAL REGION
LOCATION DETAILED: LEFT KNEE
LOCATION DETAILED: RIGHT PROXIMAL PRETIBIAL REGION
LOCATION DETAILED: EPIGASTRIC SKIN
LOCATION DETAILED: MID TRAPEZIAL NECK
LOCATION DETAILED: RIGHT POSTERIOR NECK
LOCATION DETAILED: RIGHT ANTERIOR DISTAL THIGH
LOCATION DETAILED: LEFT PROXIMAL POSTERIOR THIGH
LOCATION DETAILED: LEFT ANTERIOR MEDIAL PROXIMAL UPPER ARM

## 2023-08-17 ASSESSMENT — LOCATION ZONE DERM
LOCATION ZONE: TRUNK
LOCATION ZONE: LEG
LOCATION ZONE: ARM
LOCATION ZONE: NECK

## 2023-08-17 NOTE — PROCEDURE: BIOPSY BY SHAVE METHOD
Detail Level: Detailed
Depth Of Biopsy: dermis
Was A Bandage Applied: Yes
Size Of Lesion In Cm: 0.4
Biopsy Type: H and E
Biopsy Method: Dermablade
Anesthesia Type: 0.5% lidocaine without epinephrine
Anesthesia Volume In Cc: 0.5
Additional Anesthesia Volume In Cc (Will Not Render If 0): 0
Hemostasis: Drysol
Wound Care: Petrolatum
Dressing: bandage
Destruction After The Procedure: No
Type Of Destruction Used: Curettage
Curettage Text: The wound bed was treated with curettage after the biopsy was performed.
Cryotherapy Text: The wound bed was treated with cryotherapy after the biopsy was performed.
Electrodesiccation Text: The wound bed was treated with electrodesiccation after the biopsy was performed.
Electrodesiccation And Curettage Text: The wound bed was treated with electrodesiccation and curettage after the biopsy was performed.
Silver Nitrate Text: The wound bed was treated with silver nitrate after the biopsy was performed.
Lab: [id number]
Lab Facility: 
Consent: Written consent was obtained and risks were reviewed including but not limited to scarring, infection, bleeding, scabbing, incomplete removal, nerve damage and allergy to anesthesia.
Post-Care Instructions: I reviewed with the patient in detail post-care instructions. Patient is to keep the biopsy site dry overnight, and then apply bacitracin twice daily until healed. Patient may apply hydrogen peroxide soaks to remove any crusting.
Notification Instructions: Patient will be notified of biopsy results. However, patient instructed to call the office if not contacted within 2 weeks.
Billing Type: Third-Party Bill
Information: Selecting Yes will display possible errors in your note based on the variables you have selected. This validation is only offered as a suggestion for you. PLEASE NOTE THAT THE VALIDATION TEXT WILL BE REMOVED WHEN YOU FINALIZE YOUR NOTE. IF YOU WANT TO FAX A PRELIMINARY NOTE YOU WILL NEED TO TOGGLE THIS TO 'NO' IF YOU DO NOT WANT IT IN YOUR FAXED NOTE.

## 2023-08-17 NOTE — HPI: FULL BODY SKIN EXAMINATION
What Type Of Note Output Would You Prefer (Optional)?: Bullet Format
What Is The Reason For Today's Visit?: Full Body Skin Examination
What Is The Reason For Today's Visit? (Being Monitored For X): concerning skin lesions on an annual basis
Additional History: [facility]BE. New patient.

## 2023-08-17 NOTE — PROCEDURE: LIQUID NITROGEN
Show Applicator Variable?: Yes
Spray Paint Text: The liquid nitrogen was applied to the skin utilizing a spray paint frosting technique.
Medical Necessity Information: It is in your best interest to select a reason for this procedure from the list below. All of these items fulfill various CMS LCD requirements except the new and changing color options.
Add 52 Modifier (Optional): no
Detail Level: Detailed
Post-Care Instructions: I reviewed with the patient in detail post-care instructions. Patient is to wear sunprotection, and avoid picking at any of the treated lesions. Pt may apply Vaseline to crusted or scabbing areas.
Number Of Freeze-Thaw Cycles: 3 freeze-thaw cycles
Medical Necessity Clause: This procedure was medically necessary because the lesions that were treated were:
Consent: The patient's consent was obtained including but not limited to risks of crusting, scabbing, blistering, scarring, darker or lighter pigmentary change, recurrence, incomplete removal and infection.

## 2023-10-08 DIAGNOSIS — F41.9 ANXIETY: ICD-10-CM

## 2023-10-08 DIAGNOSIS — F33.0 MILD EPISODE OF RECURRENT MAJOR DEPRESSIVE DISORDER (HCC): ICD-10-CM

## 2023-10-09 RX ORDER — HYDROXYZINE HYDROCHLORIDE 25 MG/1
TABLET, FILM COATED ORAL
Qty: 90 TABLET | Refills: 11 | Status: SHIPPED | OUTPATIENT
Start: 2023-10-09

## 2023-10-12 ENCOUNTER — OFFICE VISIT (OUTPATIENT)
Dept: BEHAVIORAL HEALTH | Facility: CLINIC | Age: 33
End: 2023-10-12
Payer: COMMERCIAL

## 2023-10-12 DIAGNOSIS — F33.1 MODERATE EPISODE OF RECURRENT MAJOR DEPRESSIVE DISORDER (HCC): ICD-10-CM

## 2023-10-12 PROCEDURE — 90837 PSYTX W PT 60 MINUTES: CPT | Performed by: SOCIAL WORKER

## 2023-10-12 NOTE — PROGRESS NOTES
"[facility]   Therapy Progress Note        Name: Sobeida Valle  MRN: [id number]  : 1990  Age: 33 y.o.  Date of assessment: 10/12/2023  PCP: Messi Kaufman D.N.P.  Persons in attendance: Patient  Total session time: 60 minutes      Topics addressed in psychotherapy include: \" I had to clear up billing as my insurance was showing this writer as being out of network. Finally able to get them to talk to [facility] and you are a part of the network.  So much has happened since we meet in April. My stepson finally got to speak to the  on his own and he held nothing back. The  was in agreement for everything my  asked for and he is now living with us during the week and attending the high school by us and sees his mother on weekends.  She told him he was just being a rebellious teenager. So basically ignored all of his concerns.  My dad moved to [place] and is living in an independent living apartment and is working on what to do with the [employer] at [employer]. My sister somehow got an age waiver and is at [employer] boot camp for the reserves. She got a doctor to say she has anxiety not Bipolar.\"    Discussion about the behaviors she has noticed in her youngest son and has been exploring ADHD and found that she has many of the traits herself. Her mother and  confirmed that she does have many of the traits. Discussion about testing and encouraged if it helps her that she should be tested as this would make it definitve. Also for her son. Discussion that when she gets results her  should be there as he would be able to hear from a professional some of the things that anger him why they are not things you are purposely doing to but how your brain puts information together.  Discussion about this writer leaving and transfer after our last appointment.     Objective Observations:   Participation:Active verbal participation and Open to feedback   Grooming:Neat   Cognition:Alert   Eye " Contact:Good   Mood:Depressed and Anxious   Affect:Congruent with content   Thought Process:Goal-directed   Speech:Rate within normal limits and Volume within normal limits    Current Risk:   Suicide: not indicated   Homicide: not indicated   Self-Harm: not indicated   Relapse: not indicated   Safety Plan Reviewed: not indicated    Care Plan Updated: Yes, review to occur with new therapist for any updates.    Does patient express agreement with the above plan? Yes     Diagnosis:  No diagnosis found.    Referral appointment(s) scheduled? Yes       Garry Khan L.C.S.W.

## 2023-11-10 ENCOUNTER — OFFICE VISIT (OUTPATIENT)
Dept: BEHAVIORAL HEALTH | Facility: CLINIC | Age: 33
End: 2023-11-10
Payer: COMMERCIAL

## 2023-11-10 DIAGNOSIS — F33.1 MODERATE EPISODE OF RECURRENT MAJOR DEPRESSIVE DISORDER (HCC): ICD-10-CM

## 2023-11-10 PROCEDURE — 90837 PSYTX W PT 60 MINUTES: CPT | Performed by: SOCIAL WORKER

## 2023-11-10 NOTE — PROGRESS NOTES
"[facility]   Therapy Progress Note        Name: Sobeida Valle  MRN: [id number]  : 1990  Age: 33 y.o.  Date of assessment: 11/10/2023  PCP: Messi Kaufman D.N.P.  Persons in attendance: Patient  Total session time: 54 minutes      Topics addressed in psychotherapy include: \" Things have gone really well fro Romario concerning school. However, his mother will not attend anything now and he had a major concert she did not attend and the [place] [employer] on Monday but I doubt she will attend. He is so angry with her. He is still concerned about his sister and the lack of care she receives. He has told school officials, his therapist but nothing seems to be done.  His grandmother who was his support has moved in with his mother but sh eis now angry with him for telling the Court about how he was doing and wanting to attend the other school.    Her father is struggling  little at the independent living residence. He has not made decision as to if he wants to return to [place] or stay in [place] as he enjoys being around her and her family. Her sister is a boot camp but has talked about possibly moving to [place] when she completes. Her mother wanted them to come to [facility] for [place] but Romario would not be home until day of  and everyone works so she is torn as to what she should do. Encouraged that she assess what is best for her family due to the limitations of time.    Discussion about transfer and set up with new therapist after the session.    Objective Observations:   Participation:Active verbal participation and Open to feedback   Grooming:Casual   Cognition:Alert   Eye Contact:Good   Mood: focused   Affect:Congruent with content   Thought Process:Logical and Goal-directed   Speech:Rate within normal limits and Volume within normal limits    Current Risk:   Suicide: not indicated   Homicide: not indicated   Self-Harm: not indicated   Relapse: not indicated   Safety Plan Reviewed: " not applicable    Care Plan Updated: Yes, close and will develop ongoing goals with new therapist    Does patient express agreement with the above plan? Yes     Diagnosis:  1. Moderate episode of recurrent major depressive disorder (HCC)        Referral appointment(s) scheduled? Yes       Garry Khan L.C.S.W.

## 2024-01-02 ENCOUNTER — OFFICE VISIT (OUTPATIENT)
Dept: BEHAVIORAL HEALTH | Facility: CLINIC | Age: 34
End: 2024-01-02
Payer: COMMERCIAL

## 2024-01-02 DIAGNOSIS — F33.0 MILD EPISODE OF RECURRENT MAJOR DEPRESSIVE DISORDER (HCC): ICD-10-CM

## 2024-01-02 DIAGNOSIS — F41.9 ANXIETY: ICD-10-CM

## 2024-01-02 DIAGNOSIS — F33.1 MODERATE EPISODE OF RECURRENT MAJOR DEPRESSIVE DISORDER (HCC): ICD-10-CM

## 2024-01-02 PROCEDURE — 90837 PSYTX W PT 60 MINUTES: CPT | Performed by: MARRIAGE & FAMILY THERAPIST

## 2024-01-02 RX ORDER — ESCITALOPRAM OXALATE 10 MG/1
10 TABLET ORAL DAILY
Qty: 90 TABLET | Refills: 1 | Status: SHIPPED | OUTPATIENT
Start: 2024-01-02

## 2024-01-02 ASSESSMENT — ANXIETY QUESTIONNAIRES
2. NOT BEING ABLE TO STOP OR CONTROL WORRYING: NOT AT ALL
7. FEELING AFRAID AS IF SOMETHING AWFUL MIGHT HAPPEN: NOT AT ALL
GAD7 TOTAL SCORE: 5
3. WORRYING TOO MUCH ABOUT DIFFERENT THINGS: NOT AT ALL
4. TROUBLE RELAXING: SEVERAL DAYS
6. BECOMING EASILY ANNOYED OR IRRITABLE: MORE THAN HALF THE DAYS
5. BEING SO RESTLESS THAT IT IS HARD TO SIT STILL: SEVERAL DAYS
IF YOU CHECKED OFF ANY PROBLEMS ON THIS QUESTIONNAIRE, HOW DIFFICULT HAVE THESE PROBLEMS MADE IT FOR YOU TO DO YOUR WORK, TAKE CARE OF THINGS AT HOME, OR GET ALONG WITH OTHER PEOPLE: SOMEWHAT DIFFICULT
1. FEELING NERVOUS, ANXIOUS, OR ON EDGE: SEVERAL DAYS

## 2024-01-02 ASSESSMENT — PATIENT HEALTH QUESTIONNAIRE - PHQ9
5. POOR APPETITE OR OVEREATING: 1 - SEVERAL DAYS
CLINICAL INTERPRETATION OF PHQ2 SCORE: 2
SUM OF ALL RESPONSES TO PHQ QUESTIONS 1-9: 9
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
## 2024-02-09 ENCOUNTER — OFFICE VISIT (OUTPATIENT)
Dept: BEHAVIORAL HEALTH | Facility: CLINIC | Age: 34
End: 2024-02-09
Payer: COMMERCIAL

## 2024-02-09 DIAGNOSIS — F41.9 ANXIETY: ICD-10-CM

## 2024-02-09 DIAGNOSIS — F33.1 MODERATE EPISODE OF RECURRENT MAJOR DEPRESSIVE DISORDER (HCC): ICD-10-CM

## 2024-02-09 PROCEDURE — 90837 PSYTX W PT 60 MINUTES: CPT | Performed by: MARRIAGE & FAMILY THERAPIST

## 2024-02-09 NOTE — PROGRESS NOTES
[facility]   Therapy Progress Note        Name: Sobeida Valle  MRN: [id number]  : 1990  Age: 33 y.o.  Date of assessment: 2024  PCP: Messi Kaufman D.N.P.  Persons in attendance: Patient  Total session time: 58 minutes      Topics addressed in psychotherapy include:     Met with the patient in office for an individual counseling session.      Content of Therapy:     The patient reports that she completed some of the assigned homework.  She reports her love languages are: 1 - physical touch, 2 - quality time, 3 - acts of service, 4 - words of affirmation, and 5 - material gifts.  Her 's are: 1 - quality time, 2 - words of affirmation, 3 - physical touch, 4 - acts of service, and 5 - material gifts.  The patient reports that her in her  are leaving tomorrow for [place] for the honeymoon they never got.  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  Patient reports that she does not want to suffer secondary traumatic stress and become apathetic due to her new job.  Therapeutic Intervention:   This session, the therapeutic focus was on providing support and assisting the patient in processing the possible pitfalls of her new position.  Discussed with the patient her relationship with her , how their love languages were coinciding, and what could they do to get a better grade on fulfilling their spouses love languages.  Discussed with the patient their upcoming vacation and opportunity to reconnect.    This dictation has been created using voice recognition software and/or scribes. The accuracy of the dictation is limited by the abilities of the software and the expertise of the scribes. I expect there may be some errors of grammar and possibly content. I made every attempt to manually correct the errors within my dictation. However, errors related to voice recognition software and/or scribes may still exist and should be  interpreted within the appropriate context.    Objective Observations:   Participation:Active verbal participation, Attentive, and Engaged   Grooming:Casual   Cognition:Alert and Fully Oriented   Eye Contact:Good   Mood:Euthymic   Affect:Flexible and Full range   Thought Process:Logical and Goal-directed   Speech:Rate within normal limits and Volume within normal limits    Current Risk:   Suicide: low   Homicide: low   Self-Harm: low   Relapse: low   Safety Plan Reviewed: not applicable    Care Plan Updated: No    Does patient express agreement with the above plan? Yes     Diagnosis:  1. Moderate episode of recurrent major depressive disorder (HCC)    2. Anxiety        Referral appointment(s) scheduled? No       MICHAEL Ricketts

## 2024-02-26 ENCOUNTER — APPOINTMENT (OUTPATIENT)
Dept: RADIOLOGY | Facility: IMAGING CENTER | Age: 34
End: 2024-02-26
Payer: COMMERCIAL

## 2024-02-26 ENCOUNTER — OFFICE VISIT (OUTPATIENT)
Dept: URGENT CARE | Facility: CLINIC | Age: 34
End: 2024-02-26
Payer: COMMERCIAL

## 2024-02-26 VITALS
OXYGEN SATURATION: 97 % | HEIGHT: 66 IN | RESPIRATION RATE: 16 BRPM | DIASTOLIC BLOOD PRESSURE: 80 MMHG | TEMPERATURE: 98.8 F | BODY MASS INDEX: 32.14 KG/M2 | HEART RATE: 99 BPM | SYSTOLIC BLOOD PRESSURE: 122 MMHG | WEIGHT: 200 LBS

## 2024-02-26 DIAGNOSIS — R05.1 ACUTE COUGH: ICD-10-CM

## 2024-02-26 DIAGNOSIS — J45.21 MILD INTERMITTENT ASTHMA WITH ACUTE EXACERBATION: ICD-10-CM

## 2024-02-26 DIAGNOSIS — B96.89 ACUTE BACTERIAL RHINOSINUSITIS: ICD-10-CM

## 2024-02-26 DIAGNOSIS — J01.90 ACUTE BACTERIAL RHINOSINUSITIS: ICD-10-CM

## 2024-02-26 PROCEDURE — 3079F DIAST BP 80-89 MM HG: CPT

## 2024-02-26 PROCEDURE — 94640 AIRWAY INHALATION TREATMENT: CPT

## 2024-02-26 PROCEDURE — 71046 X-RAY EXAM CHEST 2 VIEWS: CPT | Mod: TC | Performed by: RADIOLOGY

## 2024-02-26 PROCEDURE — 3074F SYST BP LT 130 MM HG: CPT

## 2024-02-26 PROCEDURE — 99214 OFFICE O/P EST MOD 30 MIN: CPT | Mod: 25

## 2024-02-26 RX ORDER — DOXYCYCLINE HYCLATE 100 MG
100 TABLET ORAL 2 TIMES DAILY
Qty: 14 TABLET | Refills: 0 | Status: SHIPPED | OUTPATIENT
Start: 2024-02-26 | End: 2024-03-04

## 2024-02-26 RX ORDER — PREDNISONE 10 MG/1
40 TABLET ORAL DAILY
Qty: 20 TABLET | Refills: 0 | Status: SHIPPED | OUTPATIENT
Start: 2024-02-26 | End: 2024-03-02

## 2024-02-26 RX ORDER — FLUTICASONE PROPIONATE 50 MCG
2 SPRAY, SUSPENSION (ML) NASAL DAILY
Qty: 16 G | Refills: 0 | Status: SHIPPED | OUTPATIENT
Start: 2024-02-26 | End: 2024-03-11

## 2024-02-26 RX ORDER — ALBUTEROL SULFATE 2.5 MG/3ML
2.5 SOLUTION RESPIRATORY (INHALATION) EVERY 4 HOURS PRN
Qty: 10 EACH | Refills: 0 | Status: SHIPPED | OUTPATIENT
Start: 2024-02-26

## 2024-02-26 RX ORDER — IPRATROPIUM BROMIDE AND ALBUTEROL SULFATE 2.5; .5 MG/3ML; MG/3ML
3 SOLUTION RESPIRATORY (INHALATION) ONCE
Status: COMPLETED | OUTPATIENT
Start: 2024-02-26 | End: 2024-02-26

## 2024-02-26 RX ADMIN — IPRATROPIUM BROMIDE AND ALBUTEROL SULFATE 3 ML: 2.5; .5 SOLUTION RESPIRATORY (INHALATION) at 13:54

## 2024-02-26 ASSESSMENT — ENCOUNTER SYMPTOMS
SORE THROAT: 0
HEADACHES: 1
COUGH: 1
CHILLS: 0
SWOLLEN GLANDS: 0
SINUS PAIN: 1
SHORTNESS OF BREATH: 0
DIAPHORESIS: 0
HOARSE VOICE: 0
SINUS PRESSURE: 1
NECK PAIN: 0

## 2024-02-26 ASSESSMENT — FIBROSIS 4 INDEX: FIB4 SCORE: 0.62

## 2024-02-26 NOTE — PROGRESS NOTES
Subjective:   Sobeida Valle is a very pleasant 33 y.o. female who presents for:    Chief Complaint   Patient presents with   • Cough     Patient feeling wheezing, dry heavy cough, vomiting while heavy coughing x 3 weeks    • Headache     Face pressure    • Body Aches     Chest pain feeling tightness    • Sinusitis       HPI:    Sinusitis  Episode onset: three weeks ago, the patient developed a URI with cough three weeks ago. There has been no fever. Associated symptoms include congestion, coughing, headaches and sinus pressure. Pertinent negatives include no chills, diaphoresis, ear pain, hoarse voice, neck pain, shortness of breath, sneezing, sore throat or swollen glands. (Wheezing, chest tightness with cough, cough is non-productive, coughing fits that have lead to post-tussive vomiting ) Treatments tried: OTC cough and cold medications, humifidier, albuterol inhaler PRN for wheezing, nasal spray, Mucinex DM. The treatment provided mild relief.       ROS:    Review of Systems   Constitutional:  Negative for chills and diaphoresis.   HENT:  Positive for congestion, sinus pressure and sinus pain. Negative for ear discharge, ear pain, hoarse voice, sneezing and sore throat.    Respiratory:  Positive for cough. Negative for shortness of breath.    Musculoskeletal:  Negative for neck pain.   Neurological:  Positive for headaches.   All other systems reviewed and are negative.      Medications:      Current Outpatient Medications   Medication Sig   • escitalopram (LEXAPRO) 10 MG Tab Take 1 Tablet by mouth every day.   • hydrOXYzine HCl (ATARAX) 25 MG Tab TAKE 1 TABLET THREE TIMES A DAY AS NEEDED FOR ANXIETY   • acetaminophen (TYLENOL) 325 MG Tab Take 650 mg by mouth every four hours as needed.   • albuterol 108 (90 Base) MCG/ACT Aero Soln inhalation aerosol Inhale 2 Puffs by mouth every 6 hours as needed for Shortness of Breath.   • Cholecalciferol (VITAMIN D3 PO) Take 1 Capsule by mouth every day.   • Omega-3  Fatty Acids (FISH OIL PO) Take 1 Capsule by mouth every day.       Allergies:     Allergies   Allergen Reactions   • Pseudoephedrine Hcl Unspecified     BP spikes   • Sudafed Unspecified     Elevated blood pressure       Problem List:     Patient Active Problem List   Diagnosis   • Moderate episode of recurrent major depressive disorder (HCC)   • Anxiety   • BMI 31.0-31.9,adult   • PCOS (polycystic ovarian syndrome)   • Dyslipidemia       Surgical History:    Past Surgical History:   Procedure Laterality Date   • PB KNEE SCOPE,AID ANT CRUCIATE REPAIR Right 12/19/2019    Procedure: RECONSTRUCTION, KNEE, ACL, ARTHROSCOPIC- WITH ALLOGRAFT;  Surgeon: Mika Beach M.D.;  Location: SURGERY [facility];  Service: Orthopedics   • OTHER Bilateral 07/2018    Lasik procedure   • DENTAL EXTRACTION(S)  2003    wisdom teeth       Past Social Hx:     Social History     Socioeconomic History   • Marital status:    • Highest education level: Bachelor's degree (e.g., BA, AB, BS)   Tobacco Use   • Smoking status: Former     Current packs/day: 0.00     Average packs/day: 0.1 packs/day for 3.0 years (0.3 ttl pk-yrs)     Types: Cigarettes     Start date: 2011     Quit date: 2014     Years since quitting: 10.1   • Smokeless tobacco: Never   Vaping Use   • Vaping Use: Never used   Substance and Sexual Activity   • Alcohol use: Yes     Comment: one drink per month   • Drug use: Never   • Sexual activity: Yes     Partners: Male     Social Determinants of Health     Financial Resource Strain: Low Risk  (5/24/2022)    Overall Financial Resource Strain (CARDIA)    • Difficulty of Paying Living Expenses: Not very hard   Food Insecurity: No Food Insecurity (5/24/2022)    Hunger Vital Sign    • Worried About Running Out of Food in the Last Year: Never true    • Ran Out of Food in the Last Year: Never true   Transportation Needs: No Transportation Needs (5/24/2022)    PRAPARE - Transportation    • Lack of Transportation (Medical): No     • Lack of Transportation (Non-Medical): No   Physical Activity: Insufficiently Active (5/24/2022)    Exercise Vital Sign    • Days of Exercise per Week: 2 days    • Minutes of Exercise per Session: 60 min   Stress: Stress Concern Present (5/24/2022)    [employer] [place] [employer] - Occupational Stress Questionnaire    • Feeling of Stress : Rather much   Social Connections: Socially Isolated (5/24/2022)    Social Connection and Isolation Panel [NHANES]    • Frequency of Communication with Friends and Family: Once a week    • Frequency of Social Gatherings with Friends and Family: Once a week    • Attends Jainism Services: Never    • Active Member of Clubs or Organizations: No    • Attends Club or Organization Meetings: Never    • Marital Status:    Housing Stability: Low Risk  (5/24/2022)    Housing Stability Vital Sign    • Unable to Pay for Housing in the Last Year: No    • Number of Places Lived in the Last Year: 1    • Unstable Housing in the Last Year: No        Past Family Hx:      Family History   Problem Relation Age of Onset   • No Known Problems Mother    • Hyperlipidemia Father    • Hypertension Father    • Heart Disease Father         MI, aortic valve stenosis with replacement   • Diabetes Father    • COPD Sister    • Heart Disease Paternal Aunt         valve replacement   • Diabetes Paternal Grandmother    • No Known Problems Son        Problem list, medications, and allergies reviewed by myself today in Epic.     Objective:     Vitals:    02/26/24 1305   BP: 122/80   Pulse: 99   Resp: 16   Temp: 37.1 °C (98.8 °F)   SpO2: 97%       Physical Exam  Vitals reviewed.   Constitutional:       General: She is not in acute distress.     Appearance: Normal appearance. She is not ill-appearing, toxic-appearing or diaphoretic.   HENT:      Head: Normocephalic and atraumatic.      Right Ear: Tympanic membrane, ear canal and external ear normal.      Left Ear: Tympanic membrane, ear canal and  external ear normal.      Nose: Congestion and rhinorrhea present.      Right Sinus: Maxillary sinus tenderness present. No frontal sinus tenderness.      Left Sinus: Maxillary sinus tenderness present. No frontal sinus tenderness.      Mouth/Throat:      Mouth: Mucous membranes are moist.      Pharynx: Oropharynx is clear.   Eyes:      Extraocular Movements: Extraocular movements intact.      Conjunctiva/sclera: Conjunctivae normal.      Pupils: Pupils are equal, round, and reactive to light.   Cardiovascular:      Rate and Rhythm: Normal rate and regular rhythm.      Pulses: Normal pulses.      Heart sounds: Normal heart sounds.   Pulmonary:      Effort: Pulmonary effort is normal. No respiratory distress.      Breath sounds: Normal breath sounds. No wheezing or rales.   Chest:      Chest wall: No tenderness.   Abdominal:      General: Abdomen is flat.      Palpations: Abdomen is soft.   Musculoskeletal:         General: Normal range of motion.      Cervical back: Normal range of motion.   Skin:     General: Skin is warm and dry.   Neurological:      General: No focal deficit present.      Mental Status: She is alert and oriented to person, place, and time.   Psychiatric:         Mood and Affect: Mood normal.         Behavior: Behavior normal.         Thought Content: Thought content normal.     X-ray images viewed and interpreted by APRN, confirmed by radiology:        RADIOLOGY RESULTS   DX-CHEST-2 VIEWS    Result Date: 2/26/2024 2/26/2024 1:38 PM HISTORY/REASON FOR EXAM:  Cough TECHNIQUE/EXAM DESCRIPTION: PA and lateral views of the chest. COMPARISON:  None. FINDINGS: The lungs are clear. The cardiac silhouette is normal in size. No effusions or pneumothoraces are present. There are no significant osseous abnormalities. The visualized portions of the upper abdomen are within normal limits.     NEGATIVE TWO VIEWS OF THE CHEST.                   Assessment/Plan:     Diagnosis and associated orders:     1. Acute  cough  - DX-CHEST-2 VIEWS; Future    2. Acute bacterial rhinosinusitis  - fluticasone (FLONASE) 50 MCG/ACT nasal spray; Administer 2 Sprays into affected nostril(S) every day for 14 days.  Dispense: 16 g; Refill: 0  - doxycycline (VIBRAMYCIN) 100 MG Tab; Take 1 Tablet by mouth 2 times a day for 7 days.  Dispense: 14 Tablet; Refill: 0    3. Mild intermittent asthma with acute exacerbation  - predniSONE (DELTASONE) 10 MG Tab; Take 4 Tablets by mouth every day for 5 days.  Dispense: 20 Tablet; Refill: 0  - albuterol (PROVENTIL) 2.5mg/3ml Nebu Soln solution for nebulization; Take 3 mL by nebulization every four hours as needed for Shortness of Breath.  Dispense: 10 Each; Refill: 0  - ipratropium-albuterol (DUONEB) nebulizer solution          Comments/MDM:     Patient is well-appearing in clinic and in no acute distress.  She is been experiencing a cough, worsening asthma symptoms, headache, and sinus pressure after developing a viral URI 3 weeks prior.  Chest x-ray negative for acute cardiopulmonary abnormality.  She has bilateral wheezing auscultated in her upper lobes.  This was significantly improved after administration of DuoNeb.  On physical exam, she has bilateral maxillary sinus pain and pressure.  This is exacerbated with forward bending.  Based on the duration of her symptoms over the past 3 weeks and worsening sinus pressure, prescription for Augmentin sent to patient's preferred pharmacy for the treatment of acute bacterial rhinosinusitis.  May use albuterol nebulizer as needed for wheezing/shortness of breath due to asthma.  Prednisone burst to decrease lung inflammation.    Recommend symptomatic care:    OTC second generation antihistamine daily (cetirizine, desloratadine, fexofenadine, levocetirizine, and loratadine) daily IN COMBINATION WITH:  OTC decongestant (Sudafed - Pseudoephedrine) unless contraindication in place, such as hypertension, CAD, narrow-angle glaucoma. Use with caution if the patient  has a history of cardiac dysrhythmias, hyperthyroidism, DM, prostatic hypertrophy, and glaucoma should use with caution.  May take Coricidin HBP for individuals with high blood pressure.  Intranasal fluticasone (Flonase) daily    Nasal saline rinses 2-3 times a day   May use short term nasal sprays, such as oxymetazoline (Afrin) to help relieve nasal discomfort, congestion, and/or pressure. Decongestant sprays should not be used longer than three consecutive days.   Nasal rinsing with saline nasal spray or salt water (e.g., neti pot) can help relieve nasal dryness.  Breathe Right nasal strips at night for nasal congestion  Ponaris nasal emmollient for nasal congestion, dryness, and inflammation (do not use with iodine sensitivity)  Cool mist humidification, chest rubs, warm tea with honey, increased fluid intake to thin secretions  Tylenol or ibuprofen as needed for fever control, body aches, and headaches.    If symptoms fail to improve within 72 hours, new symptoms develop, symptoms worsen return to clinic or see PCP for re-evaluation.            All questions answered. Patient verbalized understanding and is in agreement with this plan of care.     If symptoms are worsening or not improving in 3-5 days, follow-up with PCP or return to UC. Differential diagnosis, natural history, and supportive care discussed. AVS handout given and reviewed with patient. Patient educated on red flags and when to seek treatment back in ED or UC.     I personally reviewed prior external notes and test results pertinent to today's visit.  I have independently reviewed and interpreted all diagnostics ordered during this urgent care visit.     This dictation has been created using voice recognition software. The accuracy of the dictation is limited by the abilities of the software. I expect there may be some errors of grammar and possibly content. I made every attempt to manually correct the errors within my dictation. However, errors  related to voice recognition software may still exist and should be interpreted within the appropriate context.    This note was electronically signed by СЕРГЕЙ Rao

## 2024-02-26 NOTE — PATIENT INSTRUCTIONS
"As we discussed your clinical condition would benefit from over-the-counter remedies:    Congestion:    Nasal rinsing with saline nasal spray or salt water (e.g., neti pot) can help relieve nasal dryness.    Breathe Right nasal strips at night for nasal congestion    Ponaris nasal emmollient for nasal congestion, dryness, and inflammation (do not use with iodine sensitivity)    Cool mist humidification, chest rubs, warm tea with honey, increased fluid intake to thin secretions    SALINE IRRIGATION/SPRAY 2 to 3 times per day    You may consider using a saline nasal irrigation (such as a \"Neti pot\" or similar device using sterile water, NOT tap water) or OTC saline nasal spray. Common brands include Cam Med, Sinex, [place] Nasal. May use short term nasal sprays, such as oxymetazoline (Afrin) to help relieve nasal discomfort, congestion, and/or pressure. Decongestant sprays should not be used longer than three consecutive days.     Over the counter medications:    OTC second generation antihistamine daily (cetirizine, desloratadine, fexofenadine, levocetirizine, and loratadine) daily IN COMBINATION WITH:    FLONASE (once per day) x 2 weeks     You may consider intranasal steroids such as fluticasone (brand name Flonase), (other options include Nasonex, Rhinocort, Nasacort) daily; continue for at least 2-3 weeks. Any generic should work as well but may cause slightly more nasal irritation. Please take according to package directions.  This steroid will help reduce inflammation of your sinuses.  This is the number one medication to help with seasonal allergies and treat nasal inflammation.  Cost: around $8 at [facility] for the generic fluticasone [facility] product (as of 5/20).    SUDAFED (low dose to see if tolerated) daily x 4-5 days    You may consider over-the-counter Sudafed (pseudoephedrine) to act as a decongestant to improve your breathing through your nose.  Please do not use this medication if you already have known " high blood pressure.  Please take according to package directions and note that this has a stimulating effect and should not be taken late in the day.  There is a low dose 12 hour formulation and a higher dose 24 hour formulation.  Start with a low dose to make sure you tolerate the medication.  Do not take late in the day as it may interfere with sleep.   Cost: Around $6 for the generic [facility] branded product at a 10mg dose (as of 2/2023).      SORE THROAT:    Symptom management for a sore throat includes:     Sipping cold or warm beverages (eg, tea with honey or lemon)     Eating cold or frozen desserts (eg, ice cream, popsicles)    Sucking on ice    Sucking on hard candy, CEPACOL lozenges, or medicated throat sprays. These contain the active ingredient benzocaine which is an anesthetic agent.  It helps relieve the symptoms of sore throat and may diminish your cough reflex.Please note that taking too frequently may cause harm - pay attention to package directions.    Gargling with warm salt water -  ¼ to ½ teaspoon of salt per 8 ounces (approximately 240 mL) of warm water.  Cool mist humidification  OTC Tylenol/ibuprofen PRN for pain/fever     PAIN OR FEVER:    NSAIDs  You may take Ibuprofen (brand name Motrin or Advil) 600 to 800 mg may be taken up to three times per day taken with food (do not exceed 2400 mg per day).  If you prefer you may consider Naproxen (brand name Naprosyn or Aleve) around 500 mg twice per day with food (do not exceed 1200 mg per day). Please do not take if you have known stomach ulcers or known kidney disease.     TYLENOL  You may take Tylenol according to package directions (1000 mg every 8 hours not to exceed 3000 mg per day).  Please do not consume with any alcohol products, or if you have known or suspected liver disease. These will help reduce inflammation, help with pain control, and can reduce fevers.

## 2024-03-08 ENCOUNTER — OFFICE VISIT (OUTPATIENT)
Dept: BEHAVIORAL HEALTH | Facility: CLINIC | Age: 34
End: 2024-03-08
Payer: COMMERCIAL

## 2024-03-08 DIAGNOSIS — F41.9 ANXIETY: ICD-10-CM

## 2024-03-08 DIAGNOSIS — F33.1 MODERATE EPISODE OF RECURRENT MAJOR DEPRESSIVE DISORDER (HCC): ICD-10-CM

## 2024-03-08 PROCEDURE — 90837 PSYTX W PT 60 MINUTES: CPT | Performed by: MARRIAGE & FAMILY THERAPIST

## 2024-03-09 NOTE — PROGRESS NOTES
[facility]   Therapy Progress Note        Name: Sobeida Valle  MRN: [id number]  : 1990  Age: 33 y.o.  Date of assessment: 3/8/2024  PCP: Messi Kaufman D.N.P.  Persons in attendance: Patient  Total session time: 58 minutes      Topics addressed in psychotherapy include: Met with the patient in office for an individual counseling session.      Content of Therapy:   The patient discussed that she went with her  to [facility] for the honeymoon they never had.  Patient reported that while she enjoyed a trip, it did not rekindle the romance as she had expected.  Patient reported that her and her  continue to not be intimate.  Patient reports also that she is enjoying her new job.      Therapeutic Intervention:   This session, the therapist validated the patient's love language of physical touch and disappointment of lack of intimacy with her .  Discussed possible reasons for her 's low libido.  Discussed a gentle urging to have him have a medical checkup.  Worked with the patient on accepting this time in their lives with the possibility of change.    This dictation has been created using voice recognition software and/or scribes. The accuracy of the dictation is limited by the abilities of the software and the expertise of the scribes. I expect there may be some errors of grammar and possibly content. I made every attempt to manually correct the errors within my dictation. However, errors related to voice recognition software and/or scribes may still exist and should be interpreted within the appropriate context.    Objective Observations:   Participation:Active verbal participation, Attentive, and Engaged   Grooming:Casual   Cognition:Alert and Fully Oriented   Eye Contact:Good   Mood:Euthymic   Affect:Flexible, Full range, Congruent with content, and Sad   Thought Process:Logical and Goal-directed   Speech:Rate within normal limits and Volume within normal  limits    Current Risk:   Suicide: low   Homicide: low   Self-Harm: low   Relapse: low   Safety Plan Reviewed: not applicable    Care Plan Updated: No    Does patient express agreement with the above plan? Yes     Diagnosis:  1. Moderate episode of recurrent major depressive disorder (HCC)    2. Anxiety        Referral appointment(s) scheduled? No       CARRILLO Ricketts.

## 2024-04-05 ENCOUNTER — OFFICE VISIT (OUTPATIENT)
Dept: BEHAVIORAL HEALTH | Facility: CLINIC | Age: 34
End: 2024-04-05
Payer: COMMERCIAL

## 2024-04-05 DIAGNOSIS — F33.1 MODERATE EPISODE OF RECURRENT MAJOR DEPRESSIVE DISORDER (HCC): ICD-10-CM

## 2024-04-05 DIAGNOSIS — F41.9 ANXIETY: ICD-10-CM

## 2024-04-05 PROCEDURE — 90837 PSYTX W PT 60 MINUTES: CPT | Performed by: MARRIAGE & FAMILY THERAPIST

## 2024-04-05 NOTE — PROGRESS NOTES
[facility]   Therapy Progress Note        Name: Sobeida Valle  MRN: [id number]  : 1990  Age: 33 y.o.  Date of assessment: 2024  PCP: Messi Kaufman D.N.P.  Persons in attendance: Patient  Total session time: 57 minutes      Topics addressed in psychotherapy include: Met with the patient in person for an individual counseling session.      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  The patient feels she has increased anxiety knowing the problems which are out there and society.  Patient discussed wanting to learn how to cope with that better and practicing mindfulness.      Therapeutic Intervention:   This session, the therapeutic focus was on assisting the patient in reaching those therapeutic goals.  Discussed long court sessions and ways to self sooth and distract at those times.  If discussed taking a moment before she goes into the house to unload those negative images so that she can be present with family.  Worked on mindfulness and focusing on a particular body part.  Worked on mindfulness pie throwing sounds.  Discussed using the negative images to remind herself of things to be wary of.  This dictation has been created using voice recognition software and/or scribes. The accuracy of the dictation is limited by the abilities of the software and the expertise of the scribes. I expect there may be some errors of grammar and possibly content. I made every attempt to manually correct the errors within my dictation. However, errors related to voice recognition software and/or scribes may still exist and should be interpreted within the appropriate context.    Objective Observations:   Participation:Active verbal participation, Attentive, and Engaged   Grooming:Casual   Cognition:Alert and Fully Oriented   Eye Contact:Good   Mood:Euthymic   Affect:Flexible, Full range,  and Congruent with content   Thought Process:Logical and Goal-directed   Speech:Rate within normal limits and Volume within normal limits    Current Risk:   Suicide: low   Homicide: low   Self-Harm: low   Relapse: low   Safety Plan Reviewed: not applicable    Care Plan Updated: No    Does patient express agreement with the above plan? Yes     Diagnosis:  1. Moderate episode of recurrent major depressive disorder (HCC)    2. Anxiety        Referral appointment(s) scheduled? No       MICHAEL Ricketts

## 2024-05-10 ENCOUNTER — OFFICE VISIT (OUTPATIENT)
Dept: BEHAVIORAL HEALTH | Facility: CLINIC | Age: 34
End: 2024-05-10
Payer: COMMERCIAL

## 2024-05-10 DIAGNOSIS — F33.1 MODERATE EPISODE OF RECURRENT MAJOR DEPRESSIVE DISORDER (HCC): ICD-10-CM

## 2024-05-10 DIAGNOSIS — F41.9 ANXIETY: ICD-10-CM

## 2024-05-10 PROCEDURE — 90837 PSYTX W PT 60 MINUTES: CPT | Performed by: MARRIAGE & FAMILY THERAPIST

## 2024-05-11 NOTE — PROGRESS NOTES
[facility]   Therapy Progress Note        Name: Sobeida Valle  MRN: [id number]  : 1990  Age: 33 y.o.  Date of assessment: 5/10/2024  PCP: Messi Kaufman D.N.P.  Persons in attendance: Patient  Total session time: 58 minutes      Topics addressed in psychotherapy include: Met with the patient in person for an individual counseling session.      Content of Therapy:   The patient reported that she completed a 10K race in [place] are recently.  The patient discussed recent relative death on her 's side of the family which has increased family stress.  Patient discussed feeling a great amount of fulfillment in work despite concerns of feeling like she is taking work home in her mind.  The patient discussed that she forgets to take work breaks.     Therapeutic Intervention:   This session, the therapeutic focus was on validating the patient success and providing supportive psychotherapy.  Discuss ways to disconnect from work through self care.  Worked with the patient on ways to ensure she takes breaks at work.    This dictation has been created using voice recognition software and/or scribes. The accuracy of the dictation is limited by the abilities of the software and the expertise of the scribes. I expect there may be some errors of grammar and possibly content. I made every attempt to manually correct the errors within my dictation. However, errors related to voice recognition software and/or scribes may still exist and should be interpreted within the appropriate context.    Objective Observations:   Participation:Active verbal participation, Attentive, and Engaged   Grooming:Casual   Cognition:Alert and Fully Oriented   Eye Contact:Good   Mood:Euthymic   Affect:Flexible and Full range   Thought Process:Logical and Goal-directed   Speech:Rate within normal limits and Volume within normal limits    Current Risk:   Suicide: low   Homicide: low   Self-Harm: low   Relapse: low   Safety Plan  Reviewed: not applicable    Care Plan Updated: No    Does patient express agreement with the above plan? Yes     Diagnosis:  1. Moderate episode of recurrent major depressive disorder (HCC)    2. Anxiety        Referral appointment(s) scheduled? No       MICHAEL Ricketts

## 2024-06-17 ENCOUNTER — OFFICE VISIT (OUTPATIENT)
Dept: BEHAVIORAL HEALTH | Facility: CLINIC | Age: 34
End: 2024-06-17
Payer: COMMERCIAL

## 2024-06-17 DIAGNOSIS — F41.9 ANXIETY: ICD-10-CM

## 2024-06-17 DIAGNOSIS — F33.1 MODERATE EPISODE OF RECURRENT MAJOR DEPRESSIVE DISORDER (HCC): ICD-10-CM

## 2024-06-17 PROCEDURE — 90837 PSYTX W PT 60 MINUTES: CPT | Performed by: MARRIAGE & FAMILY THERAPIST

## 2024-07-18 ENCOUNTER — OFFICE VISIT (OUTPATIENT)
Dept: BEHAVIORAL HEALTH | Facility: CLINIC | Age: 34
End: 2024-07-18
Payer: COMMERCIAL

## 2024-07-18 DIAGNOSIS — F33.1 MODERATE EPISODE OF RECURRENT MAJOR DEPRESSIVE DISORDER (HCC): ICD-10-CM

## 2024-07-18 DIAGNOSIS — F41.9 ANXIETY: ICD-10-CM

## 2024-07-18 PROCEDURE — 90834 PSYTX W PT 45 MINUTES: CPT | Performed by: MARRIAGE & FAMILY THERAPIST

## 2024-08-23 ENCOUNTER — OFFICE VISIT (OUTPATIENT)
Dept: BEHAVIORAL HEALTH | Facility: CLINIC | Age: 34
End: 2024-08-23
Payer: COMMERCIAL

## 2024-08-23 DIAGNOSIS — F33.1 MODERATE EPISODE OF RECURRENT MAJOR DEPRESSIVE DISORDER (HCC): ICD-10-CM

## 2024-08-23 DIAGNOSIS — F41.9 ANXIETY: ICD-10-CM

## 2024-08-23 PROCEDURE — 90837 PSYTX W PT 60 MINUTES: CPT | Performed by: MARRIAGE & FAMILY THERAPIST

## 2024-08-23 NOTE — PROGRESS NOTES
[facility]   Therapy Progress Note        Name: Sobeida Valle  MRN: [id number]  : 1990  Age: 34 y.o.  Date of assessment: 2024  PCP: Messi Kaufman D.N.P.  Persons in attendance: Patient  Total session time: 56 minutes      Topics addressed in psychotherapy include: Met with the patient in person for an individual counseling session.      Content of Therapy:   The patient discussed that she feels she is doing well despite several stressors going on.  Patient reports that her father is in the hospital and that her stepson is thriving (school change) despite not having much contact with his mother.  Patient report she is getting down on herself for not attending the gym.  The patient reports that work continues to go well.  Therapeutic Intervention:   This session, the therapeutic focus was on validating the patients feelings and beliefs regarding the assisting her faustino through his relational issues with his mother.  Discussed with the patient how she does for others as we introduced continued to self care.  Discussed with patience and trip she has planned and worked on a continual small improvements in self care, including weekend trips and time to herself.    This dictation has been created using voice recognition software and/or scribes. The accuracy of the dictation is limited by the abilities of the software and the expertise of the scribes. I expect there may be some errors of grammar and possibly content. I made every attempt to manually correct the errors within my dictation. However, errors related to voice recognition software and/or scribes may still exist and should be interpreted within the appropriate context.    Objective Observations:   Participation:Active verbal participation, Attentive, and Engaged   Grooming:Casual   Cognition:Alert and Fully Oriented   Eye Contact:Good   Mood:Euthymic   Affect:Flexible, Full range, and Congruent with content   Thought Process:Logical  and Goal-directed   Speech:Rate within normal limits and Volume within normal limits    Current Risk:   Suicide: low   Homicide: low   Self-Harm: low   Relapse: low   Safety Plan Reviewed: not applicable    Care Plan Updated: No    Does patient express agreement with the above plan? Yes     Diagnosis:  1. Moderate episode of recurrent major depressive disorder (HCC)    2. Anxiety        Referral appointment(s) scheduled? No       MICHAEL Ricketts

## 2024-09-13 DIAGNOSIS — F41.9 ANXIETY: ICD-10-CM

## 2024-09-13 DIAGNOSIS — F33.0 MILD EPISODE OF RECURRENT MAJOR DEPRESSIVE DISORDER (HCC): ICD-10-CM

## 2024-09-14 DIAGNOSIS — F41.9 ANXIETY: ICD-10-CM

## 2024-09-14 DIAGNOSIS — F33.0 MILD EPISODE OF RECURRENT MAJOR DEPRESSIVE DISORDER (HCC): ICD-10-CM

## 2024-09-15 SDOH — ECONOMIC STABILITY: INCOME INSECURITY: IN THE LAST 12 MONTHS, WAS THERE A TIME WHEN YOU WERE NOT ABLE TO PAY THE MORTGAGE OR RENT ON TIME?: NO

## 2024-09-15 SDOH — ECONOMIC STABILITY: INCOME INSECURITY: HOW HARD IS IT FOR YOU TO PAY FOR THE VERY BASICS LIKE FOOD, HOUSING, MEDICAL CARE, AND HEATING?: NOT VERY HARD

## 2024-09-15 SDOH — HEALTH STABILITY: PHYSICAL HEALTH: ON AVERAGE, HOW MANY MINUTES DO YOU ENGAGE IN EXERCISE AT THIS LEVEL?: 60 MIN

## 2024-09-15 SDOH — ECONOMIC STABILITY: FOOD INSECURITY: WITHIN THE PAST 12 MONTHS, YOU WORRIED THAT YOUR FOOD WOULD RUN OUT BEFORE YOU GOT MONEY TO BUY MORE.: NEVER TRUE

## 2024-09-15 SDOH — HEALTH STABILITY: MENTAL HEALTH
STRESS IS WHEN SOMEONE FEELS TENSE, NERVOUS, ANXIOUS, OR CAN'T SLEEP AT NIGHT BECAUSE THEIR MIND IS TROUBLED. HOW STRESSED ARE YOU?: TO SOME EXTENT

## 2024-09-15 SDOH — ECONOMIC STABILITY: FOOD INSECURITY: WITHIN THE PAST 12 MONTHS, THE FOOD YOU BOUGHT JUST DIDN'T LAST AND YOU DIDN'T HAVE MONEY TO GET MORE.: NEVER TRUE

## 2024-09-15 SDOH — HEALTH STABILITY: PHYSICAL HEALTH: ON AVERAGE, HOW MANY DAYS PER WEEK DO YOU ENGAGE IN MODERATE TO STRENUOUS EXERCISE (LIKE A BRISK WALK)?: 2 DAYS

## 2024-09-15 ASSESSMENT — SOCIAL DETERMINANTS OF HEALTH (SDOH)
HOW OFTEN DO YOU ATTEND CHURCH OR RELIGIOUS SERVICES?: PATIENT DECLINED
IN A TYPICAL WEEK, HOW MANY TIMES DO YOU TALK ON THE PHONE WITH FAMILY, FRIENDS, OR NEIGHBORS?: MORE THAN THREE TIMES A WEEK
HOW OFTEN DO YOU ATTEND CHURCH OR RELIGIOUS SERVICES?: PATIENT DECLINED
HOW OFTEN DO YOU HAVE A DRINK CONTAINING ALCOHOL: MONTHLY OR LESS
HOW OFTEN DO YOU GET TOGETHER WITH FRIENDS OR RELATIVES?: MORE THAN THREE TIMES A WEEK
HOW OFTEN DO YOU ATTENT MEETINGS OF THE CLUB OR ORGANIZATION YOU BELONG TO?: PATIENT DECLINED
HOW OFTEN DO YOU HAVE SIX OR MORE DRINKS ON ONE OCCASION: NEVER
IN A TYPICAL WEEK, HOW MANY TIMES DO YOU TALK ON THE PHONE WITH FAMILY, FRIENDS, OR NEIGHBORS?: MORE THAN THREE TIMES A WEEK
HOW MANY DRINKS CONTAINING ALCOHOL DO YOU HAVE ON A TYPICAL DAY WHEN YOU ARE DRINKING: 1 OR 2
DO YOU BELONG TO ANY CLUBS OR ORGANIZATIONS SUCH AS CHURCH GROUPS UNIONS, FRATERNAL OR ATHLETIC GROUPS, OR SCHOOL GROUPS?: NO
WITHIN THE PAST 12 MONTHS, YOU WORRIED THAT YOUR FOOD WOULD RUN OUT BEFORE YOU GOT THE MONEY TO BUY MORE: NEVER TRUE
HOW HARD IS IT FOR YOU TO PAY FOR THE VERY BASICS LIKE FOOD, HOUSING, MEDICAL CARE, AND HEATING?: NOT VERY HARD
HOW OFTEN DO YOU GET TOGETHER WITH FRIENDS OR RELATIVES?: MORE THAN THREE TIMES A WEEK
IN THE PAST 12 MONTHS, HAS THE ELECTRIC, GAS, OIL, OR WATER COMPANY THREATENED TO SHUT OFF SERVICE IN YOUR HOME?: NO
DO YOU BELONG TO ANY CLUBS OR ORGANIZATIONS SUCH AS CHURCH GROUPS UNIONS, FRATERNAL OR ATHLETIC GROUPS, OR SCHOOL GROUPS?: NO
HOW OFTEN DO YOU ATTENT MEETINGS OF THE CLUB OR ORGANIZATION YOU BELONG TO?: PATIENT DECLINED

## 2024-09-15 ASSESSMENT — LIFESTYLE VARIABLES
HOW OFTEN DO YOU HAVE A DRINK CONTAINING ALCOHOL: MONTHLY OR LESS
AUDIT-C TOTAL SCORE: 1
SKIP TO QUESTIONS 9-10: 1
HOW OFTEN DO YOU HAVE SIX OR MORE DRINKS ON ONE OCCASION: NEVER
HOW MANY STANDARD DRINKS CONTAINING ALCOHOL DO YOU HAVE ON A TYPICAL DAY: 1 OR 2

## 2024-09-16 RX ORDER — HYDROXYZINE HYDROCHLORIDE 25 MG/1
25 TABLET, FILM COATED ORAL 3 TIMES DAILY PRN
Qty: 90 TABLET | Refills: 11 | Status: SHIPPED | OUTPATIENT
Start: 2024-09-16 | End: 2024-09-18

## 2024-09-16 RX ORDER — ESCITALOPRAM OXALATE 10 MG/1
10 TABLET ORAL DAILY
Qty: 90 TABLET | Refills: 0 | Status: SHIPPED | OUTPATIENT
Start: 2024-09-16

## 2024-09-16 RX ORDER — ESCITALOPRAM OXALATE 10 MG/1
10 TABLET ORAL DAILY
Qty: 90 TABLET | Refills: 0 | Status: SHIPPED | OUTPATIENT
Start: 2024-09-16 | End: 2024-09-18

## 2024-09-16 NOTE — TELEPHONE ENCOUNTER
Patient comment: [facility] won't fill a 30 day supply. Am I able to increase this to a larger supply so [facility] will fill it? I am not taking 3 a day (I take 1 maybe every other day) But because it says 90 is a 30 day supply, it wont let me fill it. Thank you!

## 2024-09-17 ENCOUNTER — OFFICE VISIT (OUTPATIENT)
Dept: BEHAVIORAL HEALTH | Facility: CLINIC | Age: 34
End: 2024-09-17
Payer: COMMERCIAL

## 2024-09-17 DIAGNOSIS — F41.9 ANXIETY: ICD-10-CM

## 2024-09-17 DIAGNOSIS — F33.1 MODERATE EPISODE OF RECURRENT MAJOR DEPRESSIVE DISORDER (HCC): ICD-10-CM

## 2024-09-17 PROCEDURE — 90837 PSYTX W PT 60 MINUTES: CPT | Performed by: MARRIAGE & FAMILY THERAPIST

## 2024-09-18 ENCOUNTER — HOSPITAL ENCOUNTER (OUTPATIENT)
Facility: MEDICAL CENTER | Age: 34
End: 2024-09-18
Payer: COMMERCIAL

## 2024-09-18 ENCOUNTER — OFFICE VISIT (OUTPATIENT)
Dept: MEDICAL GROUP | Facility: PHYSICIAN GROUP | Age: 34
End: 2024-09-18
Payer: COMMERCIAL

## 2024-09-18 VITALS
HEART RATE: 79 BPM | OXYGEN SATURATION: 97 % | WEIGHT: 219.4 LBS | TEMPERATURE: 97.7 F | HEIGHT: 66 IN | SYSTOLIC BLOOD PRESSURE: 110 MMHG | DIASTOLIC BLOOD PRESSURE: 60 MMHG | BODY MASS INDEX: 35.26 KG/M2

## 2024-09-18 DIAGNOSIS — Z01.419 WELL WOMAN EXAM: ICD-10-CM

## 2024-09-18 DIAGNOSIS — E28.2 PCOS (POLYCYSTIC OVARIAN SYNDROME): ICD-10-CM

## 2024-09-18 DIAGNOSIS — F41.9 ANXIETY: ICD-10-CM

## 2024-09-18 DIAGNOSIS — N64.52 NIPPLE DISCHARGE IN FEMALE: ICD-10-CM

## 2024-09-18 DIAGNOSIS — F33.0 MILD EPISODE OF RECURRENT MAJOR DEPRESSIVE DISORDER (HCC): ICD-10-CM

## 2024-09-18 DIAGNOSIS — E78.5 DYSLIPIDEMIA: ICD-10-CM

## 2024-09-18 PROCEDURE — 87480 CANDIDA DNA DIR PROBE: CPT

## 2024-09-18 PROCEDURE — 3078F DIAST BP <80 MM HG: CPT

## 2024-09-18 PROCEDURE — 87510 GARDNER VAG DNA DIR PROBE: CPT

## 2024-09-18 PROCEDURE — 3074F SYST BP LT 130 MM HG: CPT

## 2024-09-18 PROCEDURE — 87624 HPV HI-RISK TYP POOLED RSLT: CPT

## 2024-09-18 PROCEDURE — 99395 PREV VISIT EST AGE 18-39: CPT

## 2024-09-18 PROCEDURE — 88175 CYTOPATH C/V AUTO FLUID REDO: CPT

## 2024-09-18 PROCEDURE — 87660 TRICHOMONAS VAGIN DIR PROBE: CPT

## 2024-09-18 RX ORDER — HYDROXYZINE HYDROCHLORIDE 25 MG/1
25 TABLET, FILM COATED ORAL
Qty: 90 TABLET | Refills: 3 | Status: SHIPPED | OUTPATIENT
Start: 2024-09-18
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
## 2024-09-18 NOTE — PROGRESS NOTES
Subjective:     CC:   Chief Complaint   Patient presents with    Annual Exam     PAP    Medication Management     Atarax       HPI:   Sobeida Valle is a 34 y.o. female who presents for annual exam    Patient has GYN provider: Yes  Last Pap Smear:    H/O Abnormal Pap: Yes,    Last Mammogram: N/A  Last Bone Density Test: N/A  Last Colorectal Cancer Screening: N/A  Last Tdap: UTD,   Received HPV series: No    Exercise: minimal exercise, two hours walking weekly  Diet: Healthy      No LMP recorded.  Hx STDs: No  Birth control: none  Menses every 6 weeks with 5 days with spotting, moderate, heavy bleeding.  Reports moderate cramping and does take OTC analgesics for cramps.  No significant bloating/fluid retention, pelvic pain, or dyspareunia. No abnormal vaginal discharge.  No breast tenderness, mass, nipple discharge, changes in size or contour, or abnormal cyclic discomfort.    OB History    Para Term  AB Living   1 1 1 0 0 0   SAB IAB Ectopic Molar Multiple Live Births   0 0 0 0 0 0      She  reports being sexually active and has had partner(s) who are male.    She  has a past medical history of Anxiety, Asthma, Depression, Pain, and PCOS (polycystic ovarian syndrome).  She  has a past surgical history that includes dental extraction(s) (); other (Bilateral, 2018); and pr knee scope,aid ant cruciate repair (Right, 2019).    Family History   Problem Relation Age of Onset    No Known Problems Mother     Hyperlipidemia Father     Hypertension Father     Heart Disease Father         MI, aortic valve stenosis with replacement    Diabetes Father     COPD Sister     Heart Disease Paternal Aunt         valve replacement    Diabetes Paternal Grandmother     No Known Problems Son      Social History     Tobacco Use    Smoking status: Former     Current packs/day: 0.00     Average packs/day: 0.1 packs/day for 3.0 years (0.3 ttl pk-yrs)     Types: Cigarettes     Start date:      Quit  date: 2014     Years since quitting: 10.7    Smokeless tobacco: Never   Vaping Use    Vaping status: Never Used   Substance Use Topics    Alcohol use: Yes     Comment: one drink per month    Drug use: Never       Patient Active Problem List    Diagnosis Date Noted    Dyslipidemia 06/24/2022    Moderate episode of recurrent major depressive disorder (HCC) 05/25/2022    Anxiety 05/25/2022    BMI 31.0-31.9,adult 05/25/2022    PCOS (polycystic ovarian syndrome) 05/25/2022     Current Outpatient Medications   Medication Sig Dispense Refill    hydrOXYzine HCl (ATARAX) 25 MG Tab Take 1 Tablet by mouth 1 time a day as needed for Anxiety. 90 Tablet 3    escitalopram (LEXAPRO) 10 MG Tab Take 1 Tablet by mouth every day. 90 Tablet 0    albuterol (PROVENTIL) 2.5mg/3ml Nebu Soln solution for nebulization Take 3 mL by nebulization every four hours as needed for Shortness of Breath. 10 Each 0    acetaminophen (TYLENOL) 325 MG Tab Take 650 mg by mouth every four hours as needed.      albuterol 108 (90 Base) MCG/ACT Aero Soln inhalation aerosol Inhale 2 Puffs by mouth every 6 hours as needed for Shortness of Breath. 8.5 g 0     No current facility-administered medications for this visit.     Allergies   Allergen Reactions    Pseudoephedrine Hcl Unspecified     BP spikes    Sudafed Unspecified     Elevated blood pressure       Review of Systems   Constitutional: Negative for fever, chills and malaise/fatigue.   HENT: Negative for congestion.    Eyes: Negative for pain.   Respiratory: Negative for cough and shortness of breath.    Cardiovascular: Negative for chest pain and leg swelling.   Gastrointestinal: Negative for nausea, vomiting, abdominal pain and diarrhea.   Genitourinary: Negative for dysuria and hematuria.   Skin: Negative for rash.   Neurological: Negative for dizziness, focal weakness and headaches.   Endo/Heme/Allergies: Does not bruise/bleed easily.   Psychiatric/Behavioral: Negative for depression.  The patient is not  "nervous/anxious.    Breast: She does report to occasionally express colostrum from nipples despite not breast feeding for 8 years. No masses, skin changes or adenopathy.    Objective:   /60 (BP Location: Left arm, Patient Position: Sitting, BP Cuff Size: Adult)   Pulse 79   Temp 36.5 °C (97.7 °F) (Temporal)   Ht 1.676 m (5' 6\")   Wt 99.5 kg (219 lb 6.4 oz)   SpO2 97%   BMI 35.41 kg/m²     Wt Readings from Last 4 Encounters:   09/18/24 99.5 kg (219 lb 6.4 oz)   02/26/24 90.7 kg (200 lb)   06/02/23 96.8 kg (213 lb 6.4 oz)   06/30/22 90.3 kg (199 lb)       Physical Exam:  Constitutional: Well-developed and well-nourished. Not diaphoretic. No distress.   Skin: Skin is warm and dry. No rash noted.  Head: Atraumatic without lesions.  Eyes: Conjunctivae and extraocular motions are normal. Pupils are equal, round, and reactive to light. No scleral icterus.   Ears:  External ears unremarkable. Tympanic membranes clear and intact.  Nose: Nares patent. Septum midline. Turbinates without erythema nor edema. No discharge.   Mouth/Throat: Tongue normal. Oropharynx is clear and moist. Posterior pharynx without erythema or exudates.  Neck: Supple, trachea midline. Normal range of motion. No thyromegaly present. No lymphadenopathy--cervical or supraclavicular.  Cardiovascular: Regular rate and rhythm, S1 and S2 without murmur, rubs, or gallops.    Respiratory: Effort normal. Clear to auscultation throughout. No adventitious sounds.   Abdomen: Soft, non tender, and without distention. Active bowel sounds in all four quadrants. No rebound, guarding, masses or HSM.  : Perineum and external genitalia normal without rash. Vagina with normal and physiologic and malodorous discharge. Cervix without visible lesions or discharge. Bimanual exam without adnexal masses or cervical motion tenderness.  Extremities: No cyanosis, clubbing, erythema, nor edema. Distal pulses intact and symmetric.   Musculoskeletal: All major joints " AROM full in all directions without pain.  Neurological: Alert and oriented x 3. Grossly non-focal. Strength and sensation grossly intact. DTRs 2+/3 and symmetric.   Psychiatric:  Behavior, mood, and affect are appropriate.    Assessment and Plan:     1. Well woman exam  - VAGINAL PATHOGENS DNA PANEL; Future  - PROLACTIN; Future  - Comp Metabolic Panel; Future  - HEMOGLOBIN A1C; Future  - Lipid Profile; Future  - TSH+FREE T4  - Thinprep Pap with HPV; Future    2. Dyslipidemia  - Comp Metabolic Panel; Future  - Lipid Profile; Future    3. PCOS (polycystic ovarian syndrome)  - VAGINAL PATHOGENS DNA PANEL; Future  - HEMOGLOBIN A1C; Future  - TSH+FREE T4    4. Nipple discharge in female  - PROLACTIN; Future  - Comp Metabolic Panel; Future    5. Anxiety  - hydrOXYzine HCl (ATARAX) 25 MG Tab; Take 1 Tablet by mouth 1 time a day as needed for Anxiety.  Dispense: 90 Tablet; Refill: 3    6. Mild episode of recurrent major depressive disorder (HCC)  - hydrOXYzine HCl (ATARAX) 25 MG Tab; Take 1 Tablet by mouth 1 time a day as needed for Anxiety.  Dispense: 90 Tablet; Refill: 3      Health maintenance:  UTD   Labs per orders  Immunizations per orders  Patient counseled about skin care, diet, supplements, and exercise.  Discussed  breast self exam, mammography screening, use and side effects of HRT, menopause, adequate intake of calcium and vitamin D, colorectal cancer screening     Follow-up: Return in about 1 year (around 9/18/2025), or if symptoms worsen or fail to improve.

## 2024-09-19 LAB
CANDIDA DNA VAG QL PROBE+SIG AMP: NEGATIVE
G VAGINALIS DNA VAG QL PROBE+SIG AMP: NEGATIVE
T VAGINALIS DNA VAG QL PROBE+SIG AMP: NEGATIVE

## 2024-09-22 LAB
CYTOLOGIST CVX/VAG CYTO: NORMAL
CYTOLOGY CVX/VAG DOC CYTO: NORMAL
CYTOLOGY CVX/VAG DOC THIN PREP: NORMAL
HPV I/H RISK 4 DNA CVX QL PROBE+SIG AMP: NEGATIVE
NOTE NL11727A: NORMAL
OTHER STN SPEC: NORMAL
STAT OF ADQ CVX/VAG CYTO-IMP: NORMAL

## 2024-10-18 ENCOUNTER — OFFICE VISIT (OUTPATIENT)
Dept: BEHAVIORAL HEALTH | Facility: CLINIC | Age: 34
End: 2024-10-18
Payer: COMMERCIAL

## 2024-10-18 DIAGNOSIS — F33.1 MODERATE EPISODE OF RECURRENT MAJOR DEPRESSIVE DISORDER (HCC): ICD-10-CM

## 2024-10-18 DIAGNOSIS — F41.9 ANXIETY: ICD-10-CM

## 2024-10-18 PROCEDURE — 90837 PSYTX W PT 60 MINUTES: CPT | Performed by: MARRIAGE & FAMILY THERAPIST

## 2024-11-22 ENCOUNTER — APPOINTMENT (OUTPATIENT)
Dept: BEHAVIORAL HEALTH | Facility: CLINIC | Age: 34
End: 2024-11-22
Payer: COMMERCIAL

## 2024-11-22 DIAGNOSIS — F33.1 MODERATE EPISODE OF RECURRENT MAJOR DEPRESSIVE DISORDER (HCC): ICD-10-CM

## 2024-11-22 DIAGNOSIS — F41.9 ANXIETY: ICD-10-CM

## 2024-11-22 PROCEDURE — 90837 PSYTX W PT 60 MINUTES: CPT | Performed by: MARRIAGE & FAMILY THERAPIST

## 2024-12-10 NOTE — PROGRESS NOTES
[facility]   Therapy Progress Note        Name: Sobeida Valle  MRN: [id number]  : 1990  Age: 34 y.o.  Date of assessment: 2024  PCP: Messi Kaufman D.N.P.  Persons in attendance: Patient  Total session time: 56 minutes      Topics addressed in psychotherapy include: Met with the patient in person for an individual counseling session.    Content of Therapy:   The patient discussed that she wants to visit her father in [place] for thanksgiving.  Patient discussed some reservations in doing that.  Patient discussed being very busy at work and having order cases which she feels is starting to affect her.  Patient discussed having A panic attack last Tuesday.  Therapeutic Intervention:   This session, the therapeutic focus was on validating the patient's increasing work stress and identified various other self care opportunity's.  Discussed with the patient her worries regarding excelling at her job.  Worked with patient on stress relieving activities.  This dictation has been created using voice recognition software and/or scribes. The accuracy of the dictation is limited by the abilities of the software and the expertise of the scribes. I expect there may be some errors of grammar and possibly content. I made every attempt to manually correct the errors within my dictation. However, errors related to voice recognition software and/or scribes may still exist and should be interpreted within the appropriate context.    Objective Observations:   Participation:Active verbal participation, Attentive, Engaged, and Open to feedback   Grooming:Casual   Cognition:Alert and Fully Oriented   Eye Contact:Good   Mood:Euthymic and Anxious   Affect:Flexible and Full range   Thought Process:Logical and Goal-directed   Speech:Rate within normal limits and Volume within normal limits    Current Risk:   Suicide: low   Homicide: low   Self-Harm: low   Relapse: low   Safety Plan Reviewed: not  applicable    Care Plan Updated: No    Does patient express agreement with the above plan? Yes     Diagnosis:  1. Moderate episode of recurrent major depressive disorder (HCC)    2. Anxiety        Referral appointment(s) scheduled? No       CARRILLO Ricketts.

## 2024-12-19 ENCOUNTER — OFFICE VISIT (OUTPATIENT)
Dept: BEHAVIORAL HEALTH | Facility: CLINIC | Age: 34
End: 2024-12-19
Payer: COMMERCIAL

## 2024-12-19 DIAGNOSIS — F41.9 ANXIETY: ICD-10-CM

## 2024-12-19 DIAGNOSIS — F33.1 MODERATE EPISODE OF RECURRENT MAJOR DEPRESSIVE DISORDER (HCC): ICD-10-CM

## 2024-12-19 PROCEDURE — 90837 PSYTX W PT 60 MINUTES: CPT | Performed by: MARRIAGE & FAMILY THERAPIST

## 2024-12-20 DIAGNOSIS — F41.9 ANXIETY: ICD-10-CM

## 2024-12-20 DIAGNOSIS — F33.0 MILD EPISODE OF RECURRENT MAJOR DEPRESSIVE DISORDER (HCC): ICD-10-CM

## 2024-12-20 RX ORDER — ESCITALOPRAM OXALATE 10 MG/1
10 TABLET ORAL DAILY
Qty: 90 TABLET | Refills: 0 | Status: SHIPPED | OUTPATIENT
Start: 2024-12-20

## 2024-12-20 NOTE — TELEPHONE ENCOUNTER
Received request via: Pharmacy    Was the patient seen in the last year in this department? Yes    Does the patient have an active prescription (recently filled or refills available) for medication(s) requested? No    Pharmacy Name: [facility]    Does the patient have [facility]P Plus and need 100-day supply? (This applies to ALL medications) Patient does not have SCP

## 2024-12-20 NOTE — PROGRESS NOTES
[facility]   Therapy Progress Note        Name: Sobeida Valle  MRN: [id number]  : 1990  Age: 34 y.o.  Date of assessment: 2024  PCP: Messi Kaufman D.N.P.  Persons in attendance: Patient  Total session time: 56 minutes      Topics addressed in psychotherapy include: Met with the patient in person for an individual counseling session. The patient was last seen on 2024.  Content of Therapy:   Met with the patient for an individual counseling session.  The patient discussed that she did visit her father in [place], [place].  Patient reports it was a stressful visit to to her older sister who lives with her father.  The older sister appears very childlike in her manners and requires everything to be “her way”.  While there patient also visited with an aunt, uncle, and cousin.  Patient discussed increase stressors as her 17 year old had been targeted in an extortion prime plot.  Therapeutic Intervention:   This session, the therapeutic focus was on assisting the patient in developing strategies for new situations.  Facilitated discussion with patient on conflict resolution and interpersonal effectiveness.  Plan:  Work with patient on treatment goals of managing emotions.    Impression:   1. Moderate episode of recurrent major depressive disorder (HCC) - maintaining  2. Anxiety - maintaining    This dictation has been created using voice recognition software and/or scribes. The accuracy of the dictation is limited by the abilities of the software and the expertise of the scribes. I expect there may be some errors of grammar and possibly content. I made every attempt to manually correct the errors within my dictation. However, errors related to voice recognition software and/or scribes may still exist and should be interpreted within the appropriate context.    Objective Observations:   Participation:Active verbal participation, Attentive, Engaged, and Open to  feedback   Grooming:Casual   Cognition:Alert and Fully Oriented   Eye Contact:Good   Mood:Euthymic and Anxious   Affect:Flexible and Full range   Thought Process:Logical and Goal-directed   Speech:Rate within normal limits and Volume within normal limits    Current Risk:   Suicide: low   Homicide: low   Self-Harm: low   Relapse: low   Safety Plan Reviewed: not applicable    Care Plan Updated: No    Does patient express agreement with the above plan? Yes     Diagnosis:  1. Moderate episode of recurrent major depressive disorder (HCC)    2. Anxiety        Referral appointment(s) scheduled? No       MICHAEL Ricketts

## 2025-01-13 ENCOUNTER — OFFICE VISIT (OUTPATIENT)
Dept: BEHAVIORAL HEALTH | Facility: CLINIC | Age: 35
End: 2025-01-13
Payer: COMMERCIAL

## 2025-01-13 DIAGNOSIS — F33.1 MODERATE EPISODE OF RECURRENT MAJOR DEPRESSIVE DISORDER (HCC): ICD-10-CM

## 2025-01-13 DIAGNOSIS — F41.9 ANXIETY: ICD-10-CM

## 2025-01-13 PROCEDURE — 90832 PSYTX W PT 30 MINUTES: CPT | Performed by: MARRIAGE & FAMILY THERAPIST
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
## 2025-02-13 ENCOUNTER — OFFICE VISIT (OUTPATIENT)
Dept: BEHAVIORAL HEALTH | Facility: CLINIC | Age: 35
End: 2025-02-13
Payer: COMMERCIAL

## 2025-02-13 DIAGNOSIS — F33.1 MODERATE EPISODE OF RECURRENT MAJOR DEPRESSIVE DISORDER (HCC): ICD-10-CM

## 2025-02-13 DIAGNOSIS — F41.9 ANXIETY: ICD-10-CM

## 2025-02-14 NOTE — PROGRESS NOTES
[facility]   Therapy Progress Note        Name: Sobeida Valle  MRN: [id number]  : 1990  Age: 34 y.o.  Date of assessment: 2025  PCP: Messi Kaufman D.N.P.  Persons in attendance: Patient  Total session time: 57 minutes      Topics addressed in psychotherapy include:   Met with the patient in person for an individual counseling session. The patient was last seen by this clinician on 2025  Content of Therapy:   Met with the patient for an individual counseling session.  The patient reported that she is currently being recruited to become a probation/.  Patient discussed family history of law enforcement and this being a step she has always been interested in.  Patient discussed having increase connections with family members.  Therapeutic Intervention:   This session, the therapeutic focus was on working with the patient in understanding career development and growth as a tool for minimizing anxiety and depression.  Discussed with the patient her progress and ultimate goals.  Plan:   Continue working with the patient in managing anxiety.  Impression:  1. Moderate episode of recurrent major depressive disorder (HCC) - decreasing  2. Anxiety - decreasing  This dictation has been created using voice recognition software and/or scribes. The accuracy of the dictation is limited by the abilities of the software and the expertise of the scribes. I expect there may be some errors of grammar and possibly content. I made every attempt to manually correct the errors within my dictation. However, errors related to voice recognition software and/or scribes may still exist and should be interpreted within the appropriate context.    Objective Observations:   Participation:Active verbal participation, Attentive, and Engaged   Grooming:Casual   Cognition:Alert and Fully Oriented   Eye Contact:Good   Mood:Euthymic and Anxious   Affect:Flexible, Full range, and Congruent with  content   Thought Process:Logical and Goal-directed   Speech:Rate within normal limits and Volume within normal limits    Current Risk:   Suicide: low   Homicide: low   Self-Harm: low   Relapse: low   Safety Plan Reviewed: not applicable    Care Plan Updated: No    Does patient express agreement with the above plan? Yes     Diagnosis:  1. Moderate episode of recurrent major depressive disorder (HCC)    2. Anxiety        Referral appointment(s) scheduled? No       MICHAEL Ricketts

## 2025-04-07 ENCOUNTER — OFFICE VISIT (OUTPATIENT)
Dept: BEHAVIORAL HEALTH | Facility: CLINIC | Age: 35
End: 2025-04-07
Payer: COMMERCIAL

## 2025-04-07 DIAGNOSIS — F41.9 ANXIETY: ICD-10-CM

## 2025-04-07 DIAGNOSIS — F33.1 MODERATE EPISODE OF RECURRENT MAJOR DEPRESSIVE DISORDER (HCC): ICD-10-CM

## 2025-04-07 PROCEDURE — 90834 PSYTX W PT 45 MINUTES: CPT | Performed by: MARRIAGE & FAMILY THERAPIST

## 2025-04-08 NOTE — PROGRESS NOTES
[facility]   Therapy Progress Note        Name: Sobeida Valle  MRN: [id number]  : 1990  Age: 34 y.o.  Date of assessment: 2025  PCP: Messi Kaufman D.N.P.  Persons in attendance: Patient  Total session time: 45 minutes      Topics addressed in psychotherapy include: Met with the patient in person for an individual counseling session. The patient was last seen by this clinician on 2025    Content of Therapy:   Met with the patient for an individual counseling session.  The patient reported that she continues to work out as she is being recruited to become a probation/.  The patient discussed her son's female friend who has been increasingly staying at their house more often.  As behavior has increased to a point and her mother reported that they are moving out of town and the daughter would like to move in with them.  Patient discussed being stressed because this is another teenager in the house.    Therapeutic Intervention:   This session, the therapeutic focus was on working with the patient in conceptualizing her amount of responsibility for her son's now girlfriend.  Discussed that she is 15 and will need to continue launching into adulthood.  Discussed with the patient her history of an eating disorder and how that becomes a thought when she slips off her diet.    Plan:   Continue working with the patient in managing anxiety and boundaries.    Impression:  1.Moderate episode of recurrent major depressive disorder (HCC) - decreasing  2.Anxiety - decreasing     This dictation has been created using voice recognition software and/or scribes. The accuracy of the dictation is limited by the abilities of the software and the expertise of the scribes. I expect there may be some errors of grammar and possibly content. I made every attempt to manually correct the errors within my dictation. However, errors related to voice recognition software and/or scribes may still  exist and should be interpreted within the appropriate context.    Objective Observations:   Participation:Active verbal participation, Attentive, and Engaged   Grooming:Casual   Cognition:Alert and Fully Oriented   Eye Contact:Good   Mood:Euthymic and Anxious   Affect:Flexible, Full range, and Congruent with content   Thought Process:Logical and Goal-directed   Speech:Rate within normal limits and Volume within normal limits    Current Risk:   Suicide: low   Homicide: low   Self-Harm: low   Relapse: low   Safety Plan Reviewed: not applicable    Care Plan Updated: No    Does patient express agreement with the above plan? Yes     Diagnosis:  1. Moderate episode of recurrent major depressive disorder (HCC)    2. Anxiety        Referral appointment(s) scheduled? No       CARRILLO Ricketts.

## 2025-04-12 DIAGNOSIS — F33.0 MILD EPISODE OF RECURRENT MAJOR DEPRESSIVE DISORDER (HCC): ICD-10-CM

## 2025-04-12 DIAGNOSIS — F41.9 ANXIETY: ICD-10-CM

## 2025-04-13 DIAGNOSIS — F41.9 ANXIETY: ICD-10-CM

## 2025-04-13 DIAGNOSIS — F33.0 MILD EPISODE OF RECURRENT MAJOR DEPRESSIVE DISORDER (HCC): ICD-10-CM

## 2025-04-14 RX ORDER — ESCITALOPRAM OXALATE 10 MG/1
10 TABLET ORAL DAILY
Qty: 90 TABLET | Refills: 1 | Status: SHIPPED | OUTPATIENT
Start: 2025-04-14

## 2025-04-14 NOTE — TELEPHONE ENCOUNTER
Received request via: Patient    Was the patient seen in the last year in this department? Yes    Does the patient have an active prescription (recently filled or refills available) for medication(s) requested? No    Pharmacy Name: [facility] - [address]      Does the patient have FPC Plus and need 100-day supply? (This applies to ALL medications) Patient does not have SCP

## 2025-04-17 ENCOUNTER — PATIENT MESSAGE (OUTPATIENT)
Dept: MEDICAL GROUP | Facility: PHYSICIAN GROUP | Age: 35
End: 2025-04-17
Payer: COMMERCIAL

## 2025-05-03 LAB
ALBUMIN SERPL-MCNC: 4.2 G/DL (ref 3.9–4.9)
ALP SERPL-CCNC: 72 IU/L (ref 44–121)
ALT SERPL-CCNC: 15 IU/L (ref 0–32)
AST SERPL-CCNC: 14 IU/L (ref 0–40)
BILIRUB SERPL-MCNC: 0.2 MG/DL (ref 0–1.2)
BUN SERPL-MCNC: 10 MG/DL (ref 6–20)
BUN/CREAT SERPL: 13 (ref 9–23)
CALCIUM SERPL-MCNC: 8.8 MG/DL (ref 8.7–10.2)
CHLORIDE SERPL-SCNC: 102 MMOL/L (ref 96–106)
CHOLEST SERPL-MCNC: 182 MG/DL (ref 100–199)
CO2 SERPL-SCNC: 20 MMOL/L (ref 20–29)
CREAT SERPL-MCNC: 0.76 MG/DL (ref 0.57–1)
EGFRCR SERPLBLD CKD-EPI 2021: 104 ML/MIN/1.73
GLOBULIN SER CALC-MCNC: 2.6 G/DL (ref 1.5–4.5)
GLUCOSE SERPL-MCNC: 83 MG/DL (ref 70–99)
HBA1C MFR BLD: 6 % (ref 4.8–5.6)
HDLC SERPL-MCNC: 25 MG/DL
LDL CALC COMMENT:: ABNORMAL
LDLC SERPL CALC-MCNC: 98 MG/DL (ref 0–99)
POTASSIUM SERPL-SCNC: 4.1 MMOL/L (ref 3.5–5.2)
PROLACTIN SERPL-MCNC: 20.3 NG/ML (ref 4.8–33.4)
PROT SERPL-MCNC: 6.8 G/DL (ref 6–8.5)
SODIUM SERPL-SCNC: 137 MMOL/L (ref 134–144)
TRIGL SERPL-MCNC: 346 MG/DL (ref 0–149)
VLDLC SERPL CALC-MCNC: 59 MG/DL (ref 5–40)

## 2025-05-04 ENCOUNTER — RESULTS FOLLOW-UP (OUTPATIENT)
Dept: MEDICAL GROUP | Facility: PHYSICIAN GROUP | Age: 35
End: 2025-05-04

## 2025-05-05 ENCOUNTER — APPOINTMENT (OUTPATIENT)
Dept: BEHAVIORAL HEALTH | Facility: CLINIC | Age: 35
End: 2025-05-05
Payer: COMMERCIAL

## 2025-06-16 ENCOUNTER — TELEMEDICINE (OUTPATIENT)
Dept: BEHAVIORAL HEALTH | Facility: CLINIC | Age: 35
End: 2025-06-16
Payer: COMMERCIAL

## 2025-06-16 DIAGNOSIS — F41.9 ANXIETY: Primary | ICD-10-CM

## 2025-06-16 DIAGNOSIS — F33.1 MODERATE EPISODE OF RECURRENT MAJOR DEPRESSIVE DISORDER (HCC): ICD-10-CM

## 2025-06-16 PROCEDURE — 90837 PSYTX W PT 60 MINUTES: CPT | Mod: 95 | Performed by: MARRIAGE & FAMILY THERAPIST

## 2025-06-16 NOTE — PROGRESS NOTES
[facility]   Therapy Progress Note      This visit was conducted via MS Teams using secure and encrypted videoconferencing technology.  The patient was in a private location in the state of [place].  The patient's identity was confirmed and verbal consent was obtained for this virtual visit.  Place of Service:   POS 10 -The patient is at Home during their visit             Name: Sobeida Valle  MRN: [id number]  : 1990  Age: 34 y.o.  Date of assessment: 2025  PCP: Messi Kaufman D.N.P.  Persons in attendance: Patient  Total session time: 56 minutes    Objective Observations:   Participation:Active verbal participation, Engaged, and Open to feedback   Grooming:Casual   Cognition:Alert and Fully Oriented   Eye Contact:Good   Mood:Euthymic   Affect:Flexible and Full range   Thought Process:Logical and Goal-directed   Speech:Rate within normal limits and Volume within normal limits    Current Risk:   Suicide: low   Homicide: low   Self-Harm: low   Relapse: low   Safety Plan Reviewed: not applicable    Topics addressed in psychotherapy include: Met with the patient in person for an individual counseling session. The patient was last seen by this clinician on 2025     Content of Therapy:   The patient reported that she has switch this appointment two virtual because she is home sick with the covid.  The patient discussed that her  stepfather recently  at the end of last month.  Patient reports that she is trying to be there for her mother law.  Patient reported that her stepbrother  a year ago in May.  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  The patient discussed that she has had several rough cases at work and has been working on self-care through going to a [facility], and the gym.  The patient discussed that they have canceled their heels son trip and are taking the  time to stay with the kids.  Patient discussed that her son's girlfriend has fully moved in and it has been a good experience.     Therapeutic Intervention:   This session, the therapeutic focus was on working with the patient in continued to prioritize her self-care as her work is gotten more difficult and more is required of her.  Validated the patient stressors and assisted her in working on slowing down.     Plan:   Continue working with the patient in managing anxiety and boundaries.     Impression:  1.Moderate episode of recurrent major depressive disorder (HCC) - decreasing  2.Anxiety - decreasing     This dictation has been created using voice recognition software and/or scribes. The accuracy of the dictation is limited by the abilities of the software and the expertise of the scribes. I expect there may be some errors of grammar and possibly content. I made every attempt to manually correct the errors within my dictation. However, errors related to voice recognition software and/or scribes may still exist and should be interpreted within the appropriate context.    Care Plan Updated: No    Does patient express agreement with the above plan? Yes     Diagnosis:  1. Anxiety    2. Moderate episode of recurrent major depressive disorder (HCC)        Referral appointment(s) scheduled? No       CARRILLO Ricketts.

## 2025-07-21 ENCOUNTER — OFFICE VISIT (OUTPATIENT)
Dept: BEHAVIORAL HEALTH | Facility: CLINIC | Age: 35
End: 2025-07-21
Payer: COMMERCIAL

## 2025-07-21 DIAGNOSIS — F41.9 ANXIETY: ICD-10-CM

## 2025-07-21 DIAGNOSIS — F33.1 MODERATE EPISODE OF RECURRENT MAJOR DEPRESSIVE DISORDER (HCC): Primary | ICD-10-CM

## 2025-07-21 PROCEDURE — 90837 PSYTX W PT 60 MINUTES: CPT | Performed by: MARRIAGE & FAMILY THERAPIST

## 2025-07-22 NOTE — PROGRESS NOTES
[facility]   Therapy Progress Note      Name: Sobeida Valle  MRN: [id number]  : 1990  Age: 34 y.o.  Date of assessment: 2025  PCP: Messi Kaufman D.N.P.  Persons in attendance: Patient  Total session time: 56 minutes    Objective Observations:   Participation:Active verbal participation, Engaged, and Open to feedback   Grooming:Casual   Cognition:Alert and Fully Oriented   Eye Contact:Good   Mood:Euthymic   Affect:Flexible and Full range   Thought Process:Logical and Goal-directed   Speech:Rate within normal limits and Volume within normal limits    Current Risk:   Suicide: low   Homicide: low   Self-Harm: low   Relapse: low   Safety Plan Reviewed: not applicable    Content of Therapy:   The patient discussed that she is starting to feel burned out with her work requirements.  The patient discussed that she has a very heavy caseload at the moment and understands that it will be lighter soon.  The patient discussed that she is driving to [place], [place] where she will meet her mother who will be taking her son for a few weeks.  The patient discussed her relationship with her mother and stepfather.  Patient discussed her stepfather's health issues and how she feels her mother would move to the [place] area if she could.     Therapeutic Intervention:   During this session, the therapeutic focus was on assisting the patient in processing his feelings of being burned out.  Worked with the patient discussing life seasons and cycles.  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    Plan:   Continue working with the patient in processing stress and emotions.  Impression:  1.Moderate episode of recurrent major depressive disorder (HCC) - decreasing  2.Anxiety - decreasing     This dictation has been created using voice recognition software and/or scribes. The accuracy of the dictation is limited by the abilities of the software and the expertise of the scribes.  I expect there may be some errors of grammar and possibly content. I made every attempt to manually correct the errors within my dictation. However, errors related to voice recognition software and/or scribes may still exist and should be interpreted within the appropriate context.    Care Plan Updated: No    Does patient express agreement with the above plan? Yes     Diagnosis:  1. Moderate episode of recurrent major depressive disorder (HCC)    2. Anxiety          Referral appointment(s) scheduled? No       MICHAEL Ricketts

## 2025-09-22 ENCOUNTER — APPOINTMENT (OUTPATIENT)
Dept: MEDICAL GROUP | Facility: PHYSICIAN GROUP | Age: 35
End: 2025-09-22
Payer: COMMERCIAL

## 2025-11-11 ENCOUNTER — APPOINTMENT (OUTPATIENT)
Dept: MEDICAL GROUP | Facility: PHYSICIAN GROUP | Age: 35
End: 2025-11-11
Payer: COMMERCIAL

## (undated) DEVICE — SODIUM CHL. IRRIGATION 0.9% 3000ML (4EA/CA 65CA/PF)

## (undated) DEVICE — KNOT PUSHER ULTRA FAST FIX - SNEP

## (undated) DEVICE — ELECTRODE 850 FOAM ADHESIVE - HYDROGEL RADIOTRNSPRNT (50/PK)

## (undated) DEVICE — SUTURE GENERAL

## (undated) DEVICE — BLOCK

## (undated) DEVICE — PACK MINOR BASIN - (2EA/CA)

## (undated) DEVICE — HUMID-VENT HEAT AND MOISTURE EXCHANGE- (50/BX)

## (undated) DEVICE — SHAVER4.0 AGGRESSIVE + FORMLA (5EA/BX)

## (undated) DEVICE — MAIN PUMP TUBING 13FT

## (undated) DEVICE — HEAD HOLDER JUNIOR/ADULT

## (undated) DEVICE — PROTECTOR ULNA NERVE - (36PR/CA)

## (undated) DEVICE — KIT ROOM DECONTAMINATION

## (undated) DEVICE — MASK ANESTHESIA ADULT  - (100/CA)

## (undated) DEVICE — ELECTRODE DUAL RETURN W/ CORD - (50/PK)

## (undated) DEVICE — FIBERWIRE 2.0 (12EA/BX)

## (undated) DEVICE — DRL BIT4.5 STR ENDOSCPC CANN

## (undated) DEVICE — SUTURE 2-0 VICRYL PLUS CT-1 36 (36PK/BX)"

## (undated) DEVICE — GLOVE, LITE (PAIR)

## (undated) DEVICE — CANISTER SUCTION RIGID RED 1500CC (40EA/CA)

## (undated) DEVICE — SHAVER, 5.5 RESECTOR

## (undated) DEVICE — WATER IRRIGATION STERILE 1000ML (12EA/CA)

## (undated) DEVICE — BANDAGE ELASTIC STERILE VELCRO 6 X 5 YDS (25EA/CA)

## (undated) DEVICE — GOWN WARMING STANDARD FLEX - (30/CA)

## (undated) DEVICE — BAG, SPONGE COUNT 50600

## (undated) DEVICE — TUBE CONNECTING SUCTION - CLEAR PLASTIC STERILE 72 IN (50EA/CA)

## (undated) DEVICE — NEPTUNE 4 PORT MANIFOLD - (20/PK)

## (undated) DEVICE — SUCTION INSTRUMENT YANKAUER BULBOUS TIP W/O VENT (50EA/CA)

## (undated) DEVICE — SODIUM CHL IRRIGATION 0.9% 1000ML (12EA/CA)

## (undated) DEVICE — GLOVE BIOGEL PI ORTHO SZ 8 PF LF (40PR/BX)

## (undated) DEVICE — BLADE SURGICAL #15 - (50/BX 3BX/CA)

## (undated) DEVICE — CHLORAPREP 26 ML APPLICATOR - ORANGE TINT(25/CA)

## (undated) DEVICE — KIT ANESTHESIA W/CIRCUIT & 3/LT BAG W/FILTER (20EA/CA)

## (undated) DEVICE — PACK KNEE ARTHROSCOPY SM OR - (2EA/CA)

## (undated) DEVICE — SPONGE GAUZESTER 4 X 4 4PLY - (128PK/CA)

## (undated) DEVICE — ARTHROWAND TURBOVAC 3.5/90 SCT

## (undated) DEVICE — SUTURE 3-0 ETHILON FS-1 - (36/BX) 30 INCH

## (undated) DEVICE — LACTATED RINGERS INJ 1000 ML - (14EA/CA 60CA/PF)

## (undated) DEVICE — SENSOR SPO2 NEO LNCS ADHESIVE (20/BX) SEE USER NOTES

## (undated) DEVICE — GLOVE BIOGEL SZ 8 SURGICAL PF LTX - (50PR/BX 4BX/CA)

## (undated) DEVICE — DRAPE LARGE 3 QUARTER - (20/CA)